# Patient Record
Sex: FEMALE | Race: WHITE | HISPANIC OR LATINO | Employment: FULL TIME | URBAN - METROPOLITAN AREA
[De-identification: names, ages, dates, MRNs, and addresses within clinical notes are randomized per-mention and may not be internally consistent; named-entity substitution may affect disease eponyms.]

---

## 2014-12-11 LAB — HEMOGLOBIN FRACTIONATION: NORMAL

## 2019-01-17 ENCOUNTER — OFFICE VISIT CONVERTED (OUTPATIENT)
Dept: GASTROENTEROLOGY | Facility: CLINIC | Age: 29
End: 2019-01-17
Attending: NURSE PRACTITIONER

## 2019-01-17 ENCOUNTER — CONVERSION ENCOUNTER (OUTPATIENT)
Dept: GASTROENTEROLOGY | Facility: CLINIC | Age: 29
End: 2019-01-17

## 2019-01-17 ENCOUNTER — HOSPITAL ENCOUNTER (OUTPATIENT)
Dept: LAB | Facility: HOSPITAL | Age: 29
Discharge: HOME | End: 2019-01-17

## 2019-01-17 LAB
ALBUMIN SERPL-MCNC: 4 G/DL (ref 3.5–5)
ALP SERPL-CCNC: 80 U/L (ref 42–98)
ALT SERPL-CCNC: 7 U/L (ref 10–40)
AST SERPL-CCNC: 11 U/L (ref 15–50)
BILIRUB SERPL-MCNC: 0.26 MG/DL (ref 0.2–1.3)
CONV BILI, CONJUGATED: <0.2 MG/DL (ref 0–0.6)
CONV TOTAL PROTEIN: 7.6 G/DL (ref 6.3–8.2)
CONV UNCONJUGATED BILIRUBIN: 0.1 MG/DL (ref 0–1.1)

## 2019-03-18 ENCOUNTER — CONVERSION ENCOUNTER (OUTPATIENT)
Dept: GASTROENTEROLOGY | Facility: CLINIC | Age: 29
End: 2019-03-18

## 2019-03-18 ENCOUNTER — OFFICE VISIT CONVERTED (OUTPATIENT)
Dept: GASTROENTEROLOGY | Facility: CLINIC | Age: 29
End: 2019-03-18
Attending: NURSE PRACTITIONER

## 2019-03-18 ENCOUNTER — HOSPITAL ENCOUNTER (OUTPATIENT)
Dept: LAB | Facility: HOSPITAL | Age: 29
Discharge: HOME | End: 2019-03-18

## 2019-03-21 LAB
CONV CELIAC DISEASE AB-IGA: 163 MG/DL (ref 68–408)
TTG IGA SER-ACNC: 0 U/ML (ref 0–3)

## 2021-05-16 VITALS
SYSTOLIC BLOOD PRESSURE: 108 MMHG | DIASTOLIC BLOOD PRESSURE: 75 MMHG | WEIGHT: 161 LBS | HEIGHT: 67 IN | HEART RATE: 79 BPM | TEMPERATURE: 97.6 F | BODY MASS INDEX: 25.27 KG/M2

## 2021-05-16 VITALS
SYSTOLIC BLOOD PRESSURE: 125 MMHG | DIASTOLIC BLOOD PRESSURE: 80 MMHG | HEART RATE: 90 BPM | BODY MASS INDEX: 26.21 KG/M2 | HEIGHT: 67 IN | TEMPERATURE: 97.8 F | WEIGHT: 167 LBS

## 2021-10-26 ENCOUNTER — INITIAL PRENATAL (OUTPATIENT)
Dept: OBSTETRICS AND GYNECOLOGY | Facility: CLINIC | Age: 31
End: 2021-10-26

## 2021-10-26 VITALS — WEIGHT: 187 LBS | BODY MASS INDEX: 29.29 KG/M2 | SYSTOLIC BLOOD PRESSURE: 128 MMHG | DIASTOLIC BLOOD PRESSURE: 85 MMHG

## 2021-10-26 DIAGNOSIS — K21.9 GASTROESOPHAGEAL REFLUX DISEASE WITHOUT ESOPHAGITIS: ICD-10-CM

## 2021-10-26 DIAGNOSIS — Z87.59 H/O RAPID LABOR: ICD-10-CM

## 2021-10-26 DIAGNOSIS — O21.9 NAUSEA AND VOMITING OF PREGNANCY, ANTEPARTUM: ICD-10-CM

## 2021-10-26 DIAGNOSIS — Z34.80 SUPERVISION OF OTHER NORMAL PREGNANCY, ANTEPARTUM: ICD-10-CM

## 2021-10-26 DIAGNOSIS — Z3A.01 LESS THAN 8 WEEKS GESTATION OF PREGNANCY: Primary | ICD-10-CM

## 2021-10-26 DIAGNOSIS — Z87.59 HISTORY OF RETAINED PLACENTA: ICD-10-CM

## 2021-10-26 DIAGNOSIS — O21.9 NAUSEA AND VOMITING DURING PREGNANCY: ICD-10-CM

## 2021-10-26 PROBLEM — K29.70 GASTRITIS: Status: ACTIVE | Noted: 2021-10-26

## 2021-10-26 PROBLEM — R11.0 NAUSEA: Status: ACTIVE | Noted: 2021-10-26

## 2021-10-26 PROBLEM — R11.0 NAUSEA: Status: RESOLVED | Noted: 2021-10-26 | Resolved: 2021-10-26

## 2021-10-26 LAB
ABO GROUP BLD: NORMAL
B-HCG UR QL: POSITIVE
BASOPHILS # BLD AUTO: 0.03 10*3/MM3 (ref 0–0.2)
BASOPHILS NFR BLD AUTO: 0.3 % (ref 0–1.5)
BLD GP AB SCN SERPL QL: NEGATIVE
C TRACH RRNA CVX QL NAA+PROBE: NOT DETECTED
DEPRECATED RDW RBC AUTO: 40.3 FL (ref 37–54)
EOSINOPHIL # BLD AUTO: 0.07 10*3/MM3 (ref 0–0.4)
EOSINOPHIL NFR BLD AUTO: 0.7 % (ref 0.3–6.2)
ERYTHROCYTE [DISTWIDTH] IN BLOOD BY AUTOMATED COUNT: 12.3 % (ref 12.3–15.4)
EXPIRATION DATE: ABNORMAL
GLUCOSE UR STRIP-MCNC: NEGATIVE MG/DL
HBV SURFACE AG SERPL QL IA: NORMAL
HCT VFR BLD AUTO: 37.7 % (ref 34–46.6)
HCV AB SER DONR QL: NORMAL
HGB BLD-MCNC: 12.3 G/DL (ref 12–15.9)
HIV1+2 AB SER QL: NORMAL
IMM GRANULOCYTES # BLD AUTO: 0.04 10*3/MM3 (ref 0–0.05)
IMM GRANULOCYTES NFR BLD AUTO: 0.4 % (ref 0–0.5)
INTERNAL NEGATIVE CONTROL: ABNORMAL
INTERNAL POSITIVE CONTROL: ABNORMAL
LYMPHOCYTES # BLD AUTO: 1.9 10*3/MM3 (ref 0.7–3.1)
LYMPHOCYTES NFR BLD AUTO: 19.6 % (ref 19.6–45.3)
Lab: ABNORMAL
MCH RBC QN AUTO: 29.3 PG (ref 26.6–33)
MCHC RBC AUTO-ENTMCNC: 32.6 G/DL (ref 31.5–35.7)
MCV RBC AUTO: 89.8 FL (ref 79–97)
MONOCYTES # BLD AUTO: 0.58 10*3/MM3 (ref 0.1–0.9)
MONOCYTES NFR BLD AUTO: 6 % (ref 5–12)
N GONORRHOEA RRNA SPEC QL NAA+PROBE: NOT DETECTED
NEUTROPHILS NFR BLD AUTO: 7.05 10*3/MM3 (ref 1.7–7)
NEUTROPHILS NFR BLD AUTO: 73 % (ref 42.7–76)
NRBC BLD AUTO-RTO: 0 /100 WBC (ref 0–0.2)
PLATELET # BLD AUTO: 329 10*3/MM3 (ref 140–450)
PMV BLD AUTO: 10.5 FL (ref 6–12)
PROT UR STRIP-MCNC: NEGATIVE MG/DL
RBC # BLD AUTO: 4.2 10*6/MM3 (ref 3.77–5.28)
RH BLD: POSITIVE
WBC # BLD AUTO: 9.67 10*3/MM3 (ref 3.4–10.8)

## 2021-10-26 PROCEDURE — 86803 HEPATITIS C AB TEST: CPT | Performed by: OBSTETRICS & GYNECOLOGY

## 2021-10-26 PROCEDURE — 81025 URINE PREGNANCY TEST: CPT | Performed by: OBSTETRICS & GYNECOLOGY

## 2021-10-26 PROCEDURE — G0432 EIA HIV-1/HIV-2 SCREEN: HCPCS | Performed by: OBSTETRICS & GYNECOLOGY

## 2021-10-26 PROCEDURE — 87491 CHLMYD TRACH DNA AMP PROBE: CPT | Performed by: OBSTETRICS & GYNECOLOGY

## 2021-10-26 PROCEDURE — 87340 HEPATITIS B SURFACE AG IA: CPT | Performed by: OBSTETRICS & GYNECOLOGY

## 2021-10-26 PROCEDURE — 86900 BLOOD TYPING SEROLOGIC ABO: CPT | Performed by: OBSTETRICS & GYNECOLOGY

## 2021-10-26 PROCEDURE — 99204 OFFICE O/P NEW MOD 45 MIN: CPT | Performed by: OBSTETRICS & GYNECOLOGY

## 2021-10-26 PROCEDURE — 86762 RUBELLA ANTIBODY: CPT | Performed by: OBSTETRICS & GYNECOLOGY

## 2021-10-26 PROCEDURE — 87591 N.GONORRHOEAE DNA AMP PROB: CPT | Performed by: OBSTETRICS & GYNECOLOGY

## 2021-10-26 PROCEDURE — 86901 BLOOD TYPING SEROLOGIC RH(D): CPT | Performed by: OBSTETRICS & GYNECOLOGY

## 2021-10-26 PROCEDURE — 85025 COMPLETE CBC W/AUTO DIFF WBC: CPT | Performed by: OBSTETRICS & GYNECOLOGY

## 2021-10-26 PROCEDURE — 86850 RBC ANTIBODY SCREEN: CPT | Performed by: OBSTETRICS & GYNECOLOGY

## 2021-10-26 PROCEDURE — 87086 URINE CULTURE/COLONY COUNT: CPT | Performed by: OBSTETRICS & GYNECOLOGY

## 2021-10-26 RX ORDER — DOXYLAMINE SUCCINATE AND PYRIDOXINE HYDROCHLORIDE, DELAYED RELEASE TABLETS 10 MG/10 MG 10; 10 MG/1; MG/1
2 TABLET, DELAYED RELEASE ORAL
Qty: 60 TABLET | Refills: 2 | Status: ON HOLD | OUTPATIENT
Start: 2021-10-26 | End: 2022-05-19

## 2021-10-26 RX ORDER — PRENATAL VIT NO.126/IRON/FOLIC 28MG-0.8MG
1 TABLET ORAL DAILY
COMMUNITY
End: 2023-03-09

## 2021-10-27 PROBLEM — K29.70 GASTRITIS: Chronic | Status: ACTIVE | Noted: 2021-10-26

## 2021-10-27 PROBLEM — O21.9 NAUSEA AND VOMITING OF PREGNANCY, ANTEPARTUM: Status: ACTIVE | Noted: 2021-10-27

## 2021-10-27 PROBLEM — K21.9 GERD (GASTROESOPHAGEAL REFLUX DISEASE): Chronic | Status: ACTIVE | Noted: 2021-10-26

## 2021-10-27 LAB
BACTERIA SPEC AEROBE CULT: NO GROWTH
RUBV IGG SERPL IA-ACNC: 2.72 INDEX

## 2021-10-27 NOTE — ASSESSMENT & PLAN NOTE
Continue PNV  Dating ultrasound  Call coverage reviewed  Meds safe in pregnancy reviewed  O.V. schedule reviewed  Declines prenatal genetic screening

## 2021-10-27 NOTE — PROGRESS NOTES
OB Initial Visit    CC- Here for care of current pregnancy       Subjective:  31 y.o.  presenting for her first obstetrical visit.    LMP: Patient's last menstrual period was 2021. sure    COMPLAINS OF: Nausea    Denies vaginal bleeding, pelvic cramping or other concerns      Reviewed and updated:  OBHx, GYNHx (STDs), PMHx, Medications, Allergies, PSHx, Social Hx, Preventative Hx (PAP), Hx of abuse/safe environment, Vaccine Hx including hx of chickenpox or vaccine, Genetic Hx (pt, FOB, both families).        Objective:  /85   Wt 84.8 kg (187 lb)   LMP 2021   BMI 29.29 kg/m²   General- NAD, alert and oriented, appropriate  Psych- Normal mood, good memory  Neck- No masses, no thyroid enlargement  CV- Regular rhythm, no murnurs  Resp- CTA to bases, no wheezes  Abdomen- Soft, non distended, non tender, no masses     Breast left- No mass, non tender, no nipple discharge  Breast right- No mass, non tender, no nipple discharge    External genitalia- Normal, no lesions  Urethra- Normal, no masses, non tender  Vagina- Normal, no discharge  Bladder- Normal, no masses, non tender  Cvx- Normal, no lesions, no discharge, no CMT  Uterus- Normal shape and consistency, non tender, Consistent with dates  Adnexa- Normal, no mass, non tender    Lymphatic- No palpable neck, axillary, or groin nodes  Ext- No edema, no cyanosis    Skin- No lesions, no rashes, no acanthosis nigricans      Assessment and Plan:  Diagnoses and all orders for this visit:    1. Less than 8 weeks gestation of pregnancy (Primary)  -     POC Urinalysis Dipstick  -     POC Pregnancy, Urine  -     Chlamydia trachomatis, Neisseria gonorrhoeae, PCR - , Cervix  -     Urine Culture - Urine, Urine, Clean Catch  -     OB Panel With HIV    2. Supervision of other normal pregnancy, antepartum  Overview:  EDC    Declines prenatal genetic testing    Anatomy u/s    Covid Vaccine - Complete  Flu vaccine - Recommended  Tdap vaccine -    Assessment &  Plan:  Continue PNV  Dating ultrasound  Call coverage reviewed  Meds safe in pregnancy reviewed  O.V. schedule reviewed  Declines prenatal genetic screening    Orders:  -     US Ob < 14 Weeks Single or First Gestation; Future    3. History of retained placenta    4. H/O rapid labor    5. Nausea and vomiting during pregnancy  -     doxylamine-pyridoxine (Diclegis) 10-10 MG tablet delayed-release EC tablet; Take 2 tablets by mouth every night at bedtime.  Dispense: 60 tablet; Refill: 2    6. Gastroesophageal reflux disease without esophagitis    7. Nausea and vomiting of pregnancy, antepartum  Overview:  Diclegis    Assessment & Plan:  The patient reports significant nausea and vomiting and requests diclegis specifically            7w6d    Genetic Screening: Counseled.  Declines all.     Vaccines: Recommend FLU vaccine this season, R/B discussed  s/p COVID vaccine    Counseling: Nutrition discussed, calories, activity/exercise in pregnancy  Discussed dietary restrictions/safety food preparation in pregnancy  Reviewed what to expect prenatal visits, office providers  Appropriate trimester precautions provided, N/V, vag bleeding, cramping  Questions answered    Return in about 4 weeks (around 11/23/2021) for AFTER ULTRASOUND.      Guzman Engle MD  10/26/2021

## 2021-10-28 LAB — RPR SER QL: NORMAL

## 2021-11-24 ENCOUNTER — ROUTINE PRENATAL (OUTPATIENT)
Dept: OBSTETRICS AND GYNECOLOGY | Facility: CLINIC | Age: 31
End: 2021-11-24

## 2021-11-24 VITALS — BODY MASS INDEX: 28.91 KG/M2 | DIASTOLIC BLOOD PRESSURE: 82 MMHG | SYSTOLIC BLOOD PRESSURE: 129 MMHG | WEIGHT: 184.6 LBS

## 2021-11-24 DIAGNOSIS — K21.9 GASTROESOPHAGEAL REFLUX DISEASE, UNSPECIFIED WHETHER ESOPHAGITIS PRESENT: Chronic | ICD-10-CM

## 2021-11-24 DIAGNOSIS — O21.9 NAUSEA AND VOMITING OF PREGNANCY, ANTEPARTUM: ICD-10-CM

## 2021-11-24 DIAGNOSIS — Z34.80 SUPERVISION OF OTHER NORMAL PREGNANCY, ANTEPARTUM: Primary | ICD-10-CM

## 2021-11-24 LAB
GLUCOSE UR STRIP-MCNC: NEGATIVE MG/DL
PROT UR STRIP-MCNC: NEGATIVE MG/DL

## 2021-11-24 PROCEDURE — 99214 OFFICE O/P EST MOD 30 MIN: CPT | Performed by: OBSTETRICS & GYNECOLOGY

## 2021-11-24 RX ORDER — ONDANSETRON 4 MG/1
4 TABLET, FILM COATED ORAL EVERY 8 HOURS PRN
Qty: 40 TABLET | Refills: 3 | Status: SHIPPED | OUTPATIENT
Start: 2021-11-24 | End: 2022-04-19

## 2021-11-24 RX ORDER — OMEPRAZOLE 40 MG/1
40 CAPSULE, DELAYED RELEASE ORAL DAILY
Qty: 90 CAPSULE | Refills: 3 | Status: SHIPPED | OUTPATIENT
Start: 2021-11-24 | End: 2023-03-09

## 2021-12-23 ENCOUNTER — ROUTINE PRENATAL (OUTPATIENT)
Dept: OBSTETRICS AND GYNECOLOGY | Facility: CLINIC | Age: 31
End: 2021-12-23

## 2021-12-23 VITALS — DIASTOLIC BLOOD PRESSURE: 82 MMHG | SYSTOLIC BLOOD PRESSURE: 133 MMHG | WEIGHT: 187 LBS | BODY MASS INDEX: 29.29 KG/M2

## 2021-12-23 DIAGNOSIS — Z34.80 SUPERVISION OF OTHER NORMAL PREGNANCY, ANTEPARTUM: Primary | ICD-10-CM

## 2021-12-23 DIAGNOSIS — K21.9 GASTROESOPHAGEAL REFLUX DISEASE, UNSPECIFIED WHETHER ESOPHAGITIS PRESENT: Chronic | ICD-10-CM

## 2021-12-23 DIAGNOSIS — K59.01 SLOW TRANSIT CONSTIPATION: ICD-10-CM

## 2021-12-23 LAB
GLUCOSE UR STRIP-MCNC: NEGATIVE MG/DL
PROT UR STRIP-MCNC: NEGATIVE MG/DL

## 2021-12-23 PROCEDURE — 99213 OFFICE O/P EST LOW 20 MIN: CPT | Performed by: STUDENT IN AN ORGANIZED HEALTH CARE EDUCATION/TRAINING PROGRAM

## 2021-12-23 RX ORDER — DOCUSATE SODIUM 100 MG/1
100 CAPSULE, LIQUID FILLED ORAL 2 TIMES DAILY
Qty: 60 CAPSULE | Refills: 1 | Status: SHIPPED | OUTPATIENT
Start: 2021-12-23 | End: 2022-02-01

## 2021-12-23 NOTE — PROGRESS NOTES
OB FOLLOW UP  Complaint   Chief Complaint   Patient presents with   • Routine Prenatal Visit            Deborah WILLSON is a 31 y.o.  16w5d patient being seen today for her obstetrical follow up visit. Patient denies decreased fetal movement, contractions, loss of fluid or vaginal bleeding.  Dealing with constipation. Aches and pains. No tried anything for relief. No fevers or chills. Occassional nausea.     Her prenatal care is complicated by (and status) :    Patient Active Problem List   Diagnosis   • Supervision of other normal pregnancy, antepartum   • History of retained placenta   • H/O rapid labor   • Gastritis   • GERD (gastroesophageal reflux disease)   • Nausea and vomiting of pregnancy, antepartum   • Slow transit constipation       All other systems reviewed and are negative.     The additional following portions of the patient's history were reviewed and updated as appropriate: allergies, current medications, past family history, past medical history, past social history, past surgical history and problem list.      EXAM:     Vital signs: /82   Wt 84.8 kg (187 lb)   LMP 2021   BMI 29.29 kg/m²   Appearance/psychiatric: To be in no distress  Constitutional: The patient is well nourished.  Cardiovascular: She does not have edema.  Respiratory: Respiratory effort is normal.  Gastrointestinal: Abdomen is soft, gravid, nontender, no rashes, heart tones are present, fundal height is size equals dates    Pelvic Exam: No    Urine glucose/protein: See prenatal flowsheet       Assessment and Plan    Problem List Items Addressed This Visit        Gastrointestinal Abdominal     Slow transit constipation    Relevant Medications    docusate sodium (Colace) 100 MG capsule    GERD (gastroesophageal reflux disease) (Chronic)    Overview     Prilosec, 2021         Relevant Medications    omeprazole (priLOSEC) 40 MG capsule       Gravid and     Supervision of other normal pregnancy,  antepartum - Primary    Overview     EDC    Declines prenatal genetic testing    Anatomy u/s    Covid Vaccine - Complete  Flu vaccine - Recommended  Tdap vaccine -         Relevant Orders    POC Urinalysis Dipstick (Completed)          Impression  1. Pregnancy at 16w5d  2. Fetal status reassuring.   3. Activity and Exercise discussed.    Plan  1. Follow up 4 weeks with anatomy scan  2. Colace to pharmacy 100mg BID  3. Tylenol PRN for aches and pains; pregnancy belt   4. Homeopathic remedies for nausea discussed including saltine crackers.       Patient was counseled to the following pregnancy precautions:  • Decreased fetal movement, if concern for decreased fetal movement please perform fetal kick counts you are looking for 10 movements in 2 hours.  If concern for fetal movement and not meeting that criteria, please present to triage for evaluation.  • Contractions occurring every 5 minutes for over an hour, lasting 30 to 60 seconds and progressively causing more discomfort, please seek medical attention to rule out labor  • If you believe that your water is broken, place a sanitary pad.  If pad fills in short period of time i.e. less than 5 minutes, take off pad placed another pad.  If this is saturated please present for rule out rupture of membranes  • Vaginal bleeding can be normal in pregnancy, this usually takes a form of spotting.  If having heavier bleeding like a menstrual period please present for evaluation; especially in light of severe abdominal pain this could represent a placental abruption.  • Keep all scheduled appointments as recommended.        Tevin Hutton MD  12/23/2021

## 2022-01-17 PROCEDURE — U0004 COV-19 TEST NON-CDC HGH THRU: HCPCS | Performed by: FAMILY MEDICINE

## 2022-01-18 ENCOUNTER — TELEPHONE (OUTPATIENT)
Dept: OBSTETRICS AND GYNECOLOGY | Facility: CLINIC | Age: 32
End: 2022-01-18

## 2022-01-18 NOTE — TELEPHONE ENCOUNTER
Pt called stating she tested positive for covid yesterday, her symptoms started on 01/14. She states she will be out of quarantine on 01/24. I adv if she is feeling better with no symptoms than she will be okay to come go to her ultrasound and come to her follow up on 01/25 and 01/26. Adv to call the office if she is still sick for recommendations.

## 2022-01-25 ENCOUNTER — HOSPITAL ENCOUNTER (OUTPATIENT)
Dept: ULTRASOUND IMAGING | Facility: HOSPITAL | Age: 32
Discharge: HOME | End: 2022-01-25
Admitting: OBSTETRICS & GYNECOLOGY

## 2022-01-25 DIAGNOSIS — Z34.80 SUPERVISION OF OTHER NORMAL PREGNANCY, ANTEPARTUM: ICD-10-CM

## 2022-01-25 PROCEDURE — 76811 OB US DETAILED SNGL FETUS: CPT

## 2022-01-26 ENCOUNTER — ROUTINE PRENATAL (OUTPATIENT)
Dept: OBSTETRICS AND GYNECOLOGY | Facility: CLINIC | Age: 32
End: 2022-01-26

## 2022-01-26 VITALS — BODY MASS INDEX: 30.23 KG/M2 | SYSTOLIC BLOOD PRESSURE: 135 MMHG | WEIGHT: 193 LBS | DIASTOLIC BLOOD PRESSURE: 89 MMHG

## 2022-01-26 DIAGNOSIS — Z34.80 SUPERVISION OF OTHER NORMAL PREGNANCY, ANTEPARTUM: Primary | ICD-10-CM

## 2022-01-26 DIAGNOSIS — K59.01 SLOW TRANSIT CONSTIPATION: ICD-10-CM

## 2022-01-26 LAB
GLUCOSE UR STRIP-MCNC: NEGATIVE MG/DL
PROT UR STRIP-MCNC: NEGATIVE MG/DL

## 2022-01-26 PROCEDURE — 0502F SUBSEQUENT PRENATAL CARE: CPT | Performed by: OBSTETRICS & GYNECOLOGY

## 2022-01-26 NOTE — PROGRESS NOTES
Chief Complaint:  Scheduled OB visit    HPI: 31 y.o.  at 21w4d   Positive baby movement  Anatomy ultrasound yesterday    Vitals:    22 1047   BP: 135/89   Weight: 87.5 kg (193 lb)       See OB flowsheet also for pregnancy related data.    A/P  Intrauterine pregnancy at 21w4d   Diagnoses and all orders for this visit:    1. Supervision of other normal pregnancy, antepartum (Primary)  Overview:  EDC    Declines prenatal genetic testing    Anatomy u/s    Covid Vaccine - Complete  Flu vaccine - Recommended  Tdap vaccine -    Orders:  -     POC Urinalysis Dipstick    2. Slow transit constipation    Continue prenatal vitamins.  Anatomy ultrasound was incomplete for right ventricular outflow tract and face.  We will repeat anatomy scan    PLAN:   Return in about 4 weeks (around 2022).    Ac Hood Sr., MD  2022 11:03 EST

## 2022-02-01 PROCEDURE — U0004 COV-19 TEST NON-CDC HGH THRU: HCPCS | Performed by: NURSE PRACTITIONER

## 2022-02-08 ENCOUNTER — TELEPHONE (OUTPATIENT)
Dept: OBSTETRICS AND GYNECOLOGY | Facility: CLINIC | Age: 32
End: 2022-02-08

## 2022-02-08 NOTE — TELEPHONE ENCOUNTER
Patient left a message stating she had questions about some OTC medications. I left her a message trying to return her call.

## 2022-02-08 NOTE — TELEPHONE ENCOUNTER
Patient called back and states she wanted to know what she can use over the counter to help. Patient advised she can use Monistat-7.

## 2022-02-23 ENCOUNTER — ROUTINE PRENATAL (OUTPATIENT)
Dept: OBSTETRICS AND GYNECOLOGY | Facility: CLINIC | Age: 32
End: 2022-02-23

## 2022-02-23 VITALS — WEIGHT: 193 LBS | SYSTOLIC BLOOD PRESSURE: 134 MMHG | BODY MASS INDEX: 30.23 KG/M2 | DIASTOLIC BLOOD PRESSURE: 93 MMHG

## 2022-02-23 DIAGNOSIS — K59.01 SLOW TRANSIT CONSTIPATION: ICD-10-CM

## 2022-02-23 DIAGNOSIS — Z34.80 SUPERVISION OF OTHER NORMAL PREGNANCY, ANTEPARTUM: Primary | ICD-10-CM

## 2022-02-23 LAB
DEPRECATED RDW RBC AUTO: 38.2 FL (ref 37–54)
ERYTHROCYTE [DISTWIDTH] IN BLOOD BY AUTOMATED COUNT: 12 % (ref 12.3–15.4)
GLUCOSE 1H P GLC SERPL-MCNC: 156 MG/DL (ref 65–139)
GLUCOSE UR STRIP-MCNC: NEGATIVE MG/DL
HCT VFR BLD AUTO: 30.5 % (ref 34–46.6)
HGB BLD-MCNC: 10.1 G/DL (ref 12–15.9)
MCH RBC QN AUTO: 29.4 PG (ref 26.6–33)
MCHC RBC AUTO-ENTMCNC: 33.1 G/DL (ref 31.5–35.7)
MCV RBC AUTO: 88.9 FL (ref 79–97)
PLATELET # BLD AUTO: 333 10*3/MM3 (ref 140–450)
PMV BLD AUTO: 10.6 FL (ref 6–12)
PROT UR STRIP-MCNC: NEGATIVE MG/DL
RBC # BLD AUTO: 3.43 10*6/MM3 (ref 3.77–5.28)
WBC NRBC COR # BLD: 7.81 10*3/MM3 (ref 3.4–10.8)

## 2022-02-23 PROCEDURE — 0502F SUBSEQUENT PRENATAL CARE: CPT | Performed by: OBSTETRICS & GYNECOLOGY

## 2022-02-23 PROCEDURE — 82950 GLUCOSE TEST: CPT | Performed by: OBSTETRICS & GYNECOLOGY

## 2022-02-23 PROCEDURE — 85027 COMPLETE CBC AUTOMATED: CPT | Performed by: OBSTETRICS & GYNECOLOGY

## 2022-02-23 NOTE — PROGRESS NOTES
Chief Complaint:  Scheduled OB visit    HPI: 31 y.o.  at 25w4d   Positive baby movement  Glucola today, Rh+    Vitals:    22 1406   BP: 134/93   Weight: 87.5 kg (193 lb)       See OB flowsheet also for pregnancy related data.    A/P  Intrauterine pregnancy at 25w4d   Diagnoses and all orders for this visit:    1. Supervision of other normal pregnancy, antepartum (Primary)  Overview:  EDC    Declines prenatal genetic testing    Anatomy u/s    Covid Vaccine - Complete  Flu vaccine - Recommended  Tdap vaccine -    Orders:  -     POC Urinalysis Dipstick  -     CBC (No Diff)  -     Gestational Diabetes Screen 1 Hour    2. Slow transit constipation    Blood pressure is out of parameters.    Continue prenatal vitamins.  Encouraged fetal kick counts, 10 movements in 2 hours every day.  To labor and delivery if lack fetal movement    PLAN:   Return in about 4 weeks (around 3/23/2022).    Ac Hood Sr., MD  2022 14:35 EST

## 2022-03-04 ENCOUNTER — HOSPITAL ENCOUNTER (OUTPATIENT)
Dept: ULTRASOUND IMAGING | Facility: HOSPITAL | Age: 32
Discharge: HOME | End: 2022-03-04
Admitting: OBSTETRICS & GYNECOLOGY

## 2022-03-04 DIAGNOSIS — Z34.80 SUPERVISION OF OTHER NORMAL PREGNANCY, ANTEPARTUM: ICD-10-CM

## 2022-03-04 PROCEDURE — 76805 OB US >/= 14 WKS SNGL FETUS: CPT

## 2022-03-23 ENCOUNTER — ROUTINE PRENATAL (OUTPATIENT)
Dept: OBSTETRICS AND GYNECOLOGY | Facility: CLINIC | Age: 32
End: 2022-03-23

## 2022-03-23 VITALS — BODY MASS INDEX: 30.54 KG/M2 | SYSTOLIC BLOOD PRESSURE: 118 MMHG | DIASTOLIC BLOOD PRESSURE: 81 MMHG | WEIGHT: 195 LBS

## 2022-03-23 DIAGNOSIS — K59.01 SLOW TRANSIT CONSTIPATION: ICD-10-CM

## 2022-03-23 DIAGNOSIS — O36.60X0 EXCESSIVE FETAL GROWTH AFFECTING MANAGEMENT OF PREGNANCY, ANTEPARTUM, SINGLE OR UNSPECIFIED FETUS: ICD-10-CM

## 2022-03-23 DIAGNOSIS — Z34.80 SUPERVISION OF OTHER NORMAL PREGNANCY, ANTEPARTUM: Primary | ICD-10-CM

## 2022-03-23 LAB
GLUCOSE UR STRIP-MCNC: NEGATIVE MG/DL
PROT UR STRIP-MCNC: ABNORMAL MG/DL

## 2022-03-23 PROCEDURE — 0502F SUBSEQUENT PRENATAL CARE: CPT | Performed by: OBSTETRICS & GYNECOLOGY

## 2022-03-23 NOTE — PROGRESS NOTES
Chief Complaint:  Scheduled OB visit    HPI: 32 y.o.  at 29w4d     History of 8 pounds 9 ounce vaginal delivery at 38 weeks    Vitals:    22 0946   BP: 118/81   Weight: 88.5 kg (195 lb)       See OB flowsheet also for pregnancy related data.    A/P  Intrauterine pregnancy at 29w4d   Diagnoses and all orders for this visit:    1. Supervision of other normal pregnancy, antepartum (Primary)  Overview:  EDC    Declines prenatal genetic testing    Anatomy u/s    Covid Vaccine - Complete  Flu vaccine - Recommended  Tdap vaccine -    Orders:  -     POC Urinalysis Dipstick    2. Slow transit constipation    3. Excessive fetal growth affecting management of pregnancy, antepartum, single or unspecified fetus  Recent ultrasound on  notes 76 percentile.  I would estimate this baby at approximately 8-1/2 pounds at 39 weeks, fundal height currently approximately 2 cm large for gestational age    Continue prenatal vitamins.  Discussed ultrasound findings, no placenta previa present.  Encouraged fetal kick counts, 10 movements in 2 hours every day.  To labor and delivery if lack fetal movement    PLAN:   Return in about 2 weeks (around 2022).    Ac Hood Sr., MD  3/23/2022 10:08 EDT

## 2022-04-05 ENCOUNTER — ROUTINE PRENATAL (OUTPATIENT)
Dept: OBSTETRICS AND GYNECOLOGY | Facility: CLINIC | Age: 32
End: 2022-04-05

## 2022-04-05 VITALS — BODY MASS INDEX: 30.57 KG/M2 | SYSTOLIC BLOOD PRESSURE: 131 MMHG | DIASTOLIC BLOOD PRESSURE: 85 MMHG | WEIGHT: 195.2 LBS

## 2022-04-05 DIAGNOSIS — Z34.80 SUPERVISION OF OTHER NORMAL PREGNANCY, ANTEPARTUM: Primary | ICD-10-CM

## 2022-04-05 DIAGNOSIS — K21.9 GASTROESOPHAGEAL REFLUX DISEASE, UNSPECIFIED WHETHER ESOPHAGITIS PRESENT: Chronic | ICD-10-CM

## 2022-04-05 PROBLEM — K58.9 IRRITABLE BOWEL SYNDROME: Status: ACTIVE | Noted: 2022-04-05

## 2022-04-05 LAB
GLUCOSE UR STRIP-MCNC: NEGATIVE MG/DL
PROT UR STRIP-MCNC: ABNORMAL MG/DL

## 2022-04-05 PROCEDURE — 0502F SUBSEQUENT PRENATAL CARE: CPT | Performed by: OBSTETRICS & GYNECOLOGY

## 2022-04-05 NOTE — ASSESSMENT & PLAN NOTE
Continue prenatal vitamins  Fetal kick counts   labor warnings  Jimmie patient's elevated 1 hour GTT.  Discussed the implications of gestational diabetes, particularly of poorly controlled and untreated.  Recommended either blood sugar monitoring or 3-hour GTT ASAP.  The patient prefers a 3-hour GTT.  Orders placed.

## 2022-04-05 NOTE — PROGRESS NOTES
OB FOLLOW UP    CC: Scheduled OB routine FU     Prenatal care complicated by:   Patient Active Problem List   Diagnosis   • Supervision of other normal pregnancy, antepartum   • History of retained placenta   • H/O rapid labor   • Gastritis   • GERD (gastroesophageal reflux disease)   • Nausea and vomiting of pregnancy, antepartum   • Slow transit constipation   • Irritable bowel syndrome       Subjective:   Patient has: No complaints, No leaking fluid, No vaginal bleeding, No contractions, Adequate FM  Patient reports she has not yet done a 3-hour GTT.    Objective:  Urine glucose/protein- see flow sheet      /85   Wt 88.5 kg (195 lb 3.2 oz)   LMP 2021   BMI 30.57 kg/m²   See OB flow for LE edema, and cvx exam if applicable  FHT: 146 BPM   Uterine Size: 32 cm      Assessment and Plan:  Diagnoses and all orders for this visit:    1. Supervision of other normal pregnancy, antepartum (Primary)  Overview:  EDC: 2022    Declines prenatal genetic testing    Anatomy u/s: Limited right ventricular outflow tract and face.  Follow-up ultrasound normal anatomy.    Covid Vaccine - Complete  Flu vaccine - Recommended  Tdap vaccine -    Assessment & Plan:  Continue prenatal vitamins  Fetal kick counts   labor warnings  Jimmie patient's elevated 1 hour GTT.  Discussed the implications of gestational diabetes, particularly of poorly controlled and untreated.  Recommended either blood sugar monitoring or 3-hour GTT ASAP.  The patient prefers a 3-hour GTT.  Orders placed.    Orders:  -     POC Urinalysis Dipstick  -     Gestational, Glucose Tolerance, 3 Hour; Future    2. Gastroesophageal reflux disease, unspecified whether esophagitis present  Overview:  Prilosec, 2021    Assessment & Plan:  Continue Prilosec daily          31w3d  Reassuring pregnancy progress    Counseling: OB precautions, leaking, VB, yani bernal vs PTL/Labor  FKC    Questions answered    Return in about 2 weeks (around 2022)  for Recheck.      Guzman Engle MD  04/05/2022

## 2022-04-19 ENCOUNTER — ROUTINE PRENATAL (OUTPATIENT)
Dept: OBSTETRICS AND GYNECOLOGY | Facility: CLINIC | Age: 32
End: 2022-04-19

## 2022-04-19 VITALS — SYSTOLIC BLOOD PRESSURE: 129 MMHG | BODY MASS INDEX: 30.54 KG/M2 | DIASTOLIC BLOOD PRESSURE: 80 MMHG | WEIGHT: 195 LBS

## 2022-04-19 DIAGNOSIS — K21.9 GASTROESOPHAGEAL REFLUX DISEASE, UNSPECIFIED WHETHER ESOPHAGITIS PRESENT: Chronic | ICD-10-CM

## 2022-04-19 DIAGNOSIS — Z34.80 SUPERVISION OF OTHER NORMAL PREGNANCY, ANTEPARTUM: Primary | ICD-10-CM

## 2022-04-19 PROBLEM — O21.9 NAUSEA AND VOMITING OF PREGNANCY, ANTEPARTUM: Status: RESOLVED | Noted: 2021-10-27 | Resolved: 2022-04-19

## 2022-04-19 LAB
GLUCOSE UR STRIP-MCNC: NEGATIVE MG/DL
PROT UR STRIP-MCNC: NEGATIVE MG/DL

## 2022-04-19 PROCEDURE — 0502F SUBSEQUENT PRENATAL CARE: CPT | Performed by: OBSTETRICS & GYNECOLOGY

## 2022-04-19 NOTE — PROGRESS NOTES
OB FOLLOW UP    CC: Scheduled OB routine FU     Prenatal care complicated by:   Patient Active Problem List   Diagnosis   • Supervision of other normal pregnancy, antepartum   • History of retained placenta   • H/O rapid labor   • Gastritis   • GERD (gastroesophageal reflux disease)   • Slow transit constipation   • Irritable bowel syndrome       Subjective:   Patient has: No complaints, No leaking fluid, No vaginal bleeding, No contractions, Adequate FM  Reports some Wheeler Burns    Objective:  Urine glucose/protein- see flow sheet      /80   Wt 88.5 kg (195 lb)   LMP 2021   BMI 30.54 kg/m²   See OB flow for LE edema, and cvx exam if applicable  FHT: 149 BPM   Uterine Size: 34 cm      Assessment and Plan:  Diagnoses and all orders for this visit:    1. Supervision of other normal pregnancy, antepartum (Primary)  Overview:  EDC: 2022    Declines prenatal genetic testing    Anatomy u/s: Limited right ventricular outflow tract and face.  Follow-up ultrasound normal anatomy.    Covid Vaccine - Complete  Flu vaccine - Recommended  Tdap vaccine -    Assessment & Plan:  Continue prenatal vitamins  Fetal kick counts   labor warnings    Orders:  -     POC Urinalysis Dipstick    2. Gastroesophageal reflux disease, unspecified whether esophagitis present  Overview:  Prilosec, 2021    Assessment & Plan:  Continue Prilosec          33w3d  Reassuring pregnancy progress    Counseling: OB precautions, leaking, VB, yani burns vs PTL/Labor  FKC    Questions answered    Return in about 2 weeks (around 5/3/2022) for Recheck.      Guzman Engle MD  2022

## 2022-04-20 ENCOUNTER — LAB (OUTPATIENT)
Dept: LAB | Facility: HOSPITAL | Age: 32
End: 2022-04-20

## 2022-04-20 DIAGNOSIS — Z34.80 SUPERVISION OF OTHER NORMAL PREGNANCY, ANTEPARTUM: ICD-10-CM

## 2022-04-20 LAB
GLUCOSE BLDC GLUCOMTR-MCNC: 86 MG/DL (ref 70–99)
GLUCOSE P FAST SERPL-MCNC: 88 MG/DL (ref 65–94)
GTT GEST 2H PNL UR+SERPL: 177 MG/DL (ref 65–179)
GTT GEST 3H PNL SERPL: 128 MG/DL (ref 65–139)
GTT GEST 3H PNL SERPL: 170 MG/DL (ref 65–154)

## 2022-04-20 PROCEDURE — 82952 GTT-ADDED SAMPLES: CPT

## 2022-04-20 PROCEDURE — 82951 GLUCOSE TOLERANCE TEST (GTT): CPT

## 2022-04-20 PROCEDURE — 36415 COLL VENOUS BLD VENIPUNCTURE: CPT

## 2022-05-04 ENCOUNTER — ROUTINE PRENATAL (OUTPATIENT)
Dept: OBSTETRICS AND GYNECOLOGY | Facility: CLINIC | Age: 32
End: 2022-05-04

## 2022-05-04 VITALS — WEIGHT: 195 LBS | BODY MASS INDEX: 30.54 KG/M2 | DIASTOLIC BLOOD PRESSURE: 82 MMHG | SYSTOLIC BLOOD PRESSURE: 132 MMHG

## 2022-05-04 DIAGNOSIS — Z34.80 SUPERVISION OF OTHER NORMAL PREGNANCY, ANTEPARTUM: Primary | ICD-10-CM

## 2022-05-04 LAB
GLUCOSE UR STRIP-MCNC: NEGATIVE MG/DL
PROT UR STRIP-MCNC: NEGATIVE MG/DL

## 2022-05-04 PROCEDURE — 0502F SUBSEQUENT PRENATAL CARE: CPT | Performed by: OBSTETRICS & GYNECOLOGY

## 2022-05-04 RX ORDER — ONDANSETRON 4 MG/1
4 TABLET, FILM COATED ORAL EVERY 8 HOURS PRN
COMMUNITY
End: 2023-03-09

## 2022-05-04 NOTE — PROGRESS NOTES
Chief Complaint:  Scheduled OB visit    HPI: 32 y.o.  at 35w4d   Positive baby movement    Vitals:    22 0910   BP: 132/82   Weight: 88.5 kg (195 lb)       See OB flowsheet also for pregnancy related data.    A/P  Intrauterine pregnancy at 35w4d   Diagnoses and all orders for this visit:    1. Supervision of other normal pregnancy, antepartum (Primary)  Overview:  EDC: 2022    Declines prenatal genetic testing    Anatomy u/s: Limited right ventricular outflow tract and face.  Follow-up ultrasound normal anatomy.    Covid Vaccine - Complete  Flu vaccine - Recommended  Tdap vaccine -    Orders:  -     POC Urinalysis Dipstick      Blood pressure is out of parameters.    Continue prenatal vitamins.  Encouraged fetal kick counts, 10 movements in 2 hours every day.  To labor and delivery if lack fetal movement   Discussed Glucola results, normal 3-hour.  Rh+ reviewed.    PLAN:   Return in about 1 week (around 2022).    Ac Hood Sr., MD  2022 09:22 EDT

## 2022-05-10 NOTE — PROGRESS NOTES
Chief Complaint:  Scheduled OB visit    HPI: 32 y.o.  at 36w3d   Positive baby movement  Blood pressure mildly elevated today.  Discussed need for labs.    Vitals:    22 1509 22 1524   BP: 141/96 147/96   Weight: 88.5 kg (195 lb)        See OB flowsheet also for pregnancy related data.    A/P  Intrauterine pregnancy at 36w3d   Diagnoses and all orders for this visit:    1. Supervision of other normal pregnancy, antepartum (Primary)  Overview:  EDC: 2022    Declines prenatal genetic testing    Anatomy u/s: Limited right ventricular outflow tract and face.  Follow-up ultrasound normal anatomy.    Covid Vaccine - Complete  Flu vaccine - Recommended  Tdap vaccine -    Orders:  -     POC Urinalysis Dipstick  -     Group B Streptococcus Culture - Swab, Vaginal/Rectum    2. Slow transit constipation    Elevated blood pressure today  CBC comprehensive metabolic panel, PC ratio ordered for at the hospital today.  NST ordered.    Continue prenatal vitamins.  Encouraged fetal kick counts, 10 movements in 2 hours every day.  To labor and delivery if lack fetal movement  Pre-eclampsia symptoms were discussed and warnings were given.    PLAN:   Return in about 1 week (around 2022).    Ac Hood Sr., MD  2022 15:29 EDT

## 2022-05-11 ENCOUNTER — HOSPITAL ENCOUNTER (OUTPATIENT)
Facility: HOSPITAL | Age: 32
Discharge: HOME | End: 2022-05-11
Attending: OBSTETRICS & GYNECOLOGY | Admitting: OBSTETRICS & GYNECOLOGY

## 2022-05-11 ENCOUNTER — ROUTINE PRENATAL (OUTPATIENT)
Dept: OBSTETRICS AND GYNECOLOGY | Facility: CLINIC | Age: 32
End: 2022-05-11

## 2022-05-11 VITALS — SYSTOLIC BLOOD PRESSURE: 136 MMHG | RESPIRATION RATE: 18 BRPM | DIASTOLIC BLOOD PRESSURE: 89 MMHG | HEART RATE: 114 BPM

## 2022-05-11 VITALS — DIASTOLIC BLOOD PRESSURE: 96 MMHG | WEIGHT: 195 LBS | BODY MASS INDEX: 30.54 KG/M2 | SYSTOLIC BLOOD PRESSURE: 147 MMHG

## 2022-05-11 DIAGNOSIS — K59.01 SLOW TRANSIT CONSTIPATION: ICD-10-CM

## 2022-05-11 DIAGNOSIS — Z34.80 SUPERVISION OF OTHER NORMAL PREGNANCY, ANTEPARTUM: Primary | ICD-10-CM

## 2022-05-11 LAB
ALBUMIN SERPL-MCNC: 3.4 G/DL (ref 3.5–5.2)
ALBUMIN/GLOB SERPL: 1.1 G/DL
ALP SERPL-CCNC: 121 U/L (ref 39–117)
ALT SERPL W P-5'-P-CCNC: 5 U/L (ref 1–33)
ANION GAP SERPL CALCULATED.3IONS-SCNC: 12.3 MMOL/L (ref 5–15)
AST SERPL-CCNC: 14 U/L (ref 1–32)
BILIRUB SERPL-MCNC: 0.2 MG/DL (ref 0–1.2)
BUN SERPL-MCNC: 7 MG/DL (ref 6–20)
BUN/CREAT SERPL: 13.2 (ref 7–25)
CALCIUM SPEC-SCNC: 9.1 MG/DL (ref 8.6–10.5)
CHLORIDE SERPL-SCNC: 104 MMOL/L (ref 98–107)
CO2 SERPL-SCNC: 19.7 MMOL/L (ref 22–29)
CREAT SERPL-MCNC: 0.53 MG/DL (ref 0.57–1)
CREAT UR-MCNC: 184.1 MG/DL
DEPRECATED RDW RBC AUTO: 40.8 FL (ref 37–54)
EGFRCR SERPLBLD CKD-EPI 2021: 127.8 ML/MIN/1.73
ERYTHROCYTE [DISTWIDTH] IN BLOOD BY AUTOMATED COUNT: 13.2 % (ref 12.3–15.4)
GLOBULIN UR ELPH-MCNC: 3.1 GM/DL
GLUCOSE SERPL-MCNC: 119 MG/DL (ref 65–99)
GLUCOSE UR STRIP-MCNC: NEGATIVE MG/DL
HCT VFR BLD AUTO: 29.1 % (ref 34–46.6)
HGB BLD-MCNC: 9.2 G/DL (ref 12–15.9)
MCH RBC QN AUTO: 26.4 PG (ref 26.6–33)
MCHC RBC AUTO-ENTMCNC: 31.6 G/DL (ref 31.5–35.7)
MCV RBC AUTO: 83.6 FL (ref 79–97)
PLATELET # BLD AUTO: 281 10*3/MM3 (ref 140–450)
PMV BLD AUTO: 10.1 FL (ref 6–12)
POTASSIUM SERPL-SCNC: 3.8 MMOL/L (ref 3.5–5.2)
PROT ?TM UR-MCNC: 30.1 MG/DL
PROT SERPL-MCNC: 6.5 G/DL (ref 6–8.5)
PROT UR STRIP-MCNC: NEGATIVE MG/DL
PROT/CREAT UR: 0.16 MG/G{CREAT}
RBC # BLD AUTO: 3.48 10*6/MM3 (ref 3.77–5.28)
SODIUM SERPL-SCNC: 136 MMOL/L (ref 136–145)
WBC NRBC COR # BLD: 7.79 10*3/MM3 (ref 3.4–10.8)

## 2022-05-11 PROCEDURE — 84156 ASSAY OF PROTEIN URINE: CPT | Performed by: OBSTETRICS & GYNECOLOGY

## 2022-05-11 PROCEDURE — 87081 CULTURE SCREEN ONLY: CPT | Performed by: OBSTETRICS & GYNECOLOGY

## 2022-05-11 PROCEDURE — 59426 ANTEPARTUM CARE ONLY: CPT | Performed by: OBSTETRICS & GYNECOLOGY

## 2022-05-11 PROCEDURE — 85027 COMPLETE CBC AUTOMATED: CPT | Performed by: OBSTETRICS & GYNECOLOGY

## 2022-05-11 PROCEDURE — 82570 ASSAY OF URINE CREATININE: CPT | Performed by: OBSTETRICS & GYNECOLOGY

## 2022-05-11 PROCEDURE — 59025 FETAL NON-STRESS TEST: CPT | Performed by: OBSTETRICS & GYNECOLOGY

## 2022-05-11 PROCEDURE — 59025 FETAL NON-STRESS TEST: CPT

## 2022-05-11 PROCEDURE — 80053 COMPREHEN METABOLIC PANEL: CPT | Performed by: OBSTETRICS & GYNECOLOGY

## 2022-05-11 PROCEDURE — G0463 HOSPITAL OUTPT CLINIC VISIT: HCPCS

## 2022-05-11 NOTE — NON STRESS TEST
Obstetrical Non-stress Test Interpretation     Name:  Deborah WILLSON  MRN: [id number]    32 y.o. female  at 36w4d    Indication: NST for elevated blood pressure, sent from office       Fetal Assessment  Fetal Movement: active  Fetal HR Assessment Method: external  Fetal HR (beats/min): 155  Fetal HR Baseline: normal range  Fetal HR Variability: moderate (amplitude range 6 to 25 bpm)  Fetal HR Accelerations: greater than/equal to 15 bpm, lasting at least 15 seconds  Fetal HR Decelerations: absent  Sinusoidal Pattern Present: absent    Patient Vitals for the past 24 hrs:   BP Pulse Resp   22 1650 136/89 114 --   22 1640 138/97 120 --   22 1630 141/96 -- --   22 1620 135/92 114 18       Reason for test: OB Triage, Hypertension  Date of Test: 2022  Time frame of test: 8419-6581  RN NST Interpretation: Reactive    Dr Little notified of patient arrival. Sent from Dr. Hood for labs due to elevated blood pressure. Today is first instance of elevated blood pressure. Patient states she has had headache and floaters in vision yesterday and today. Doesn't have headache currently. All blood pressures and labs reviewed with Dr. Little. Patient to follow up with NST on Friday and off work until delivery. Discharge order received.     Brooke Tavares RN  2022  16:55 EDT

## 2022-05-13 ENCOUNTER — HOSPITAL ENCOUNTER (OUTPATIENT)
Dept: LABOR AND DELIVERY | Facility: HOSPITAL | Age: 32
Discharge: HOME | End: 2022-05-13

## 2022-05-13 ENCOUNTER — HOSPITAL ENCOUNTER (OUTPATIENT)
Facility: HOSPITAL | Age: 32
Discharge: HOME | End: 2022-05-13
Attending: OBSTETRICS & GYNECOLOGY | Admitting: OBSTETRICS & GYNECOLOGY

## 2022-05-13 VITALS
WEIGHT: 195 LBS | SYSTOLIC BLOOD PRESSURE: 133 MMHG | TEMPERATURE: 98.2 F | RESPIRATION RATE: 16 BRPM | BODY MASS INDEX: 30.61 KG/M2 | HEIGHT: 67 IN | HEART RATE: 119 BPM | DIASTOLIC BLOOD PRESSURE: 87 MMHG

## 2022-05-13 LAB — BACTERIA SPEC AEROBE CULT: NORMAL

## 2022-05-13 PROCEDURE — 59025 FETAL NON-STRESS TEST: CPT | Performed by: OBSTETRICS & GYNECOLOGY

## 2022-05-13 PROCEDURE — 59025 FETAL NON-STRESS TEST: CPT

## 2022-05-13 NOTE — NON STRESS TEST
"Obstetrical Non-stress Test Interpretation     Name:  Deborah WILLSON  MRN: [id number]    32 y.o. female  at 36w6d    Indication: elevated bp      Fetal Assessment  Fetal Movement: active  Fetal HR Assessment Method: external  Fetal HR (beats/min): 145  Fetal HR Baseline: normal range  Fetal HR Variability: moderate (amplitude range 6 to 25 bpm)  Fetal HR Accelerations: greater than/equal to 15 bpm, lasting at least 15 seconds  Fetal HR Decelerations: absent    /87 (BP Location: Right arm, Patient Position: Sitting)   Pulse 119   Temp 98.2 °F (36.8 °C) (Oral)   Resp 16   Ht 170.2 cm (67\")   Wt 88.5 kg (195 lb)   LMP 2021   BMI 30.54 kg/m²     Reason for test:  (nst for elevated bp)  Date of Test: 2022  Time frame of test: 3941-6861  RN NST Interpretation: Reactive    Patient unsure if nst should be continued. nst scheduled for next week 22. Told patient to discuss plan of care at Tuesday office visit.     Nicole Chisholm  2022  12:41 EDT  "

## 2022-05-17 ENCOUNTER — PREP FOR SURGERY (OUTPATIENT)
Dept: OTHER | Facility: HOSPITAL | Age: 32
End: 2022-05-17

## 2022-05-17 ENCOUNTER — HOSPITAL ENCOUNTER (OUTPATIENT)
Dept: LABOR AND DELIVERY | Facility: HOSPITAL | Age: 32
Discharge: HOME | End: 2022-05-17

## 2022-05-17 ENCOUNTER — ROUTINE PRENATAL (OUTPATIENT)
Dept: OBSTETRICS AND GYNECOLOGY | Facility: CLINIC | Age: 32
End: 2022-05-17

## 2022-05-17 ENCOUNTER — HOSPITAL ENCOUNTER (OUTPATIENT)
Facility: HOSPITAL | Age: 32
Discharge: HOME | End: 2022-05-17
Attending: OBSTETRICS & GYNECOLOGY | Admitting: OBSTETRICS & GYNECOLOGY

## 2022-05-17 VITALS — DIASTOLIC BLOOD PRESSURE: 89 MMHG | SYSTOLIC BLOOD PRESSURE: 137 MMHG | WEIGHT: 196 LBS | BODY MASS INDEX: 30.7 KG/M2

## 2022-05-17 VITALS
SYSTOLIC BLOOD PRESSURE: 127 MMHG | RESPIRATION RATE: 20 BRPM | DIASTOLIC BLOOD PRESSURE: 83 MMHG | TEMPERATURE: 98.7 F | HEART RATE: 106 BPM | OXYGEN SATURATION: 98 %

## 2022-05-17 DIAGNOSIS — Z87.59 H/O RAPID LABOR: ICD-10-CM

## 2022-05-17 DIAGNOSIS — O13.3 GESTATIONAL HYPERTENSION, THIRD TRIMESTER: ICD-10-CM

## 2022-05-17 DIAGNOSIS — Z34.80 SUPERVISION OF OTHER NORMAL PREGNANCY, ANTEPARTUM: Primary | ICD-10-CM

## 2022-05-17 DIAGNOSIS — Z87.59 HISTORY OF RETAINED PLACENTA: ICD-10-CM

## 2022-05-17 DIAGNOSIS — Z87.59 H/O RAPID LABOR: Primary | ICD-10-CM

## 2022-05-17 DIAGNOSIS — K59.01 SLOW TRANSIT CONSTIPATION: ICD-10-CM

## 2022-05-17 LAB
GLUCOSE UR STRIP-MCNC: NEGATIVE MG/DL
PROT UR STRIP-MCNC: ABNORMAL MG/DL
SARS-COV-2 RNA PNL SPEC NAA+PROBE: NOT DETECTED

## 2022-05-17 PROCEDURE — 59025 FETAL NON-STRESS TEST: CPT | Performed by: OBSTETRICS & GYNECOLOGY

## 2022-05-17 PROCEDURE — U0004 COV-19 TEST NON-CDC HGH THRU: HCPCS | Performed by: OBSTETRICS & GYNECOLOGY

## 2022-05-17 PROCEDURE — 0502F SUBSEQUENT PRENATAL CARE: CPT | Performed by: OBSTETRICS & GYNECOLOGY

## 2022-05-17 PROCEDURE — 59025 FETAL NON-STRESS TEST: CPT

## 2022-05-17 NOTE — ASSESSMENT & PLAN NOTE
Patient's blood pressure is normal here today, but borderline.  She had elevated blood pressures at her last office visit as well as in triage.  The patient reports she is having diastolics in the 90s at home.  I have strong suspicion she may have gestational hypertension.  We have discussed implication of this including recommendation for delivery at 37 weeks or greater.  The patient would like to proceed with delivery.  The risks, benefits, and alternatives of induction of labor have been discussed the patient, including the risk of failed induction of labor, an increased risk of  delivery, and increased risk of fetal heart rate problems during the induction that may lead to emergent  delivery.  We have discussed the risks of medication use for induction and augmentation of labor including prostaglandins, such as Cytotec and other agents such as oxytocin.  We have discussed that drugs such as this have warnings for use in pregnancy, however, have long established safety and efficacy for induction of labor or augmentation of labor when used appropriately.  We have discussed the use of a cervical ripening balloon for induction of labor and/or cervical ripening.  We have discussed the risks, benefits, and alternatives to this method of induction of labor we've also discussed that [employer] of [employer] gyne[employer] endorses the use of drugs such as these for induction of labor and augmentation of labor.  The patient expressed her understanding of these risks and wishes to proceed.

## 2022-05-17 NOTE — NON STRESS TEST
Obstetrical Non-stress Test Interpretation     Name:  Deborah WILLSON  MRN: [id number]    32 y.o. female  at 37w3d    Indication: htn      Fetal Assessment  Fetal Movement: active  Fetal HR Assessment Method: external  Fetal HR (beats/min): 150  Fetal HR Baseline: normal range  Fetal HR Variability: moderate (amplitude range 6 to 25 bpm)  Fetal HR Accelerations: greater than/equal to 15 bpm, lasting at least 15 seconds  Fetal HR Decelerations: absent  Sinusoidal Pattern Present: absent  Fetal HR Tracing Category: Category I    /83   Pulse 106   Temp 98.7 °F (37.1 °C)   Resp 20   LMP 2021   SpO2 98%     Reason for test: Hypertension  Date of Test: 2022  Time frame of test:   RN NST Interpretation: Reactive      Za Escobar RN  2022  10:17 EDT

## 2022-05-17 NOTE — PROGRESS NOTES
OB FOLLOW UP    CC: Scheduled OB routine FU     Prenatal care complicated by:   Patient Active Problem List   Diagnosis   • Supervision of other normal pregnancy, antepartum   • History of retained placenta   • H/O rapid labor   • Gastritis   • GERD (gastroesophageal reflux disease)   • Slow transit constipation   • Irritable bowel syndrome   • Gestational hypertension, third trimester       Subjective:   Patient has: No complaints, No leaking fluid, No vaginal bleeding, No contractions, Adequate FM    Objective:  Urine glucose/protein- see flow sheet      /89   Wt 88.9 kg (196 lb)   LMP 09/02/2021   BMI 30.70 kg/m²   See OB flow for LE edema, and cvx exam if applicable  FHT: 148 BPM   Uterine Size: 39 cm      Assessment and Plan:  Diagnoses and all orders for this visit:    1. Supervision of other normal pregnancy, antepartum (Primary)  Overview:  EDC: 6/4/2022    Declines prenatal genetic testing    Anatomy u/s: Limited right ventricular outflow tract and face.  Follow-up ultrasound normal anatomy.    Covid Vaccine - Complete  Flu vaccine - Recommended  Tdap vaccine -    Assessment & Plan:  GBS is negative.  Continue prenatal vitamins  Fetal kick counts  Labor instructions    Orders:  -     POC Urinalysis Dipstick    2. Slow transit constipation    3. H/O rapid labor    4. History of retained placenta    5. Gestational hypertension, third trimester  Assessment & Plan:  Patient's blood pressure is normal here today, but borderline.  She had elevated blood pressures at her last office visit as well as in triage.  The patient reports she is having diastolics in the 90s at home.  I have strong suspicion she may have gestational hypertension.  We have discussed implication of this including recommendation for delivery at 37 weeks or greater.  The patient would like to proceed with delivery.  The risks, benefits, and alternatives of induction of labor have been discussed the patient, including the risk of  failed induction of labor, an increased risk of  delivery, and increased risk of fetal heart rate problems during the induction that may lead to emergent  delivery.  We have discussed the risks of medication use for induction and augmentation of labor including prostaglandins, such as Cytotec and other agents such as oxytocin.  We have discussed that drugs such as this have warnings for use in pregnancy, however, have long established safety and efficacy for induction of labor or augmentation of labor when used appropriately.  We have discussed the use of a cervical ripening balloon for induction of labor and/or cervical ripening.  We have discussed the risks, benefits, and alternatives to this method of induction of labor we've also discussed that [employer] of obstetrics and gyne[employer] endorses the use of drugs such as these for induction of labor and augmentation of labor.  The patient expressed her understanding of these risks and wishes to proceed.          37w3d  Reassuring pregnancy progress    Counseling: OB precautions, leaking, VB, yani bernal vs PTL/Labor  FKC  HTN precautions, HA, vision change, RUQ/epigastric pain, edema    Questions answered    No follow-ups on file.      Guzman Engle MD  2022

## 2022-05-19 ENCOUNTER — HOSPITAL ENCOUNTER (INPATIENT)
Facility: HOSPITAL | Age: 32
LOS: 2 days | Discharge: HOME | End: 2022-05-21
Attending: OBSTETRICS & GYNECOLOGY | Admitting: OBSTETRICS & GYNECOLOGY

## 2022-05-19 ENCOUNTER — HOSPITAL ENCOUNTER (OUTPATIENT)
Dept: LABOR AND DELIVERY | Facility: HOSPITAL | Age: 32
Discharge: HOME | End: 2022-05-19

## 2022-05-19 ENCOUNTER — ANESTHESIA (OUTPATIENT)
Dept: LABOR AND DELIVERY | Facility: HOSPITAL | Age: 32
End: 2022-05-19

## 2022-05-19 ENCOUNTER — ANESTHESIA EVENT (OUTPATIENT)
Dept: LABOR AND DELIVERY | Facility: HOSPITAL | Age: 32
End: 2022-05-19

## 2022-05-19 PROBLEM — E55.9 VITAMIN D DEFICIENCY: Status: ACTIVE | Noted: 2022-05-19

## 2022-05-19 PROBLEM — E53.8 VITAMIN B12 DEFICIENCY: Status: ACTIVE | Noted: 2022-05-19

## 2022-05-19 PROBLEM — F41.9 ANXIETY: Status: ACTIVE | Noted: 2022-05-19

## 2022-05-19 PROBLEM — K29.70 GASTRITIS: Chronic | Status: RESOLVED | Noted: 2021-10-26 | Resolved: 2022-05-19

## 2022-05-19 PROBLEM — Z37.9 NORMAL LABOR: Status: ACTIVE | Noted: 2022-05-19

## 2022-05-19 PROBLEM — Z34.90 ENCOUNTER FOR INDUCTION OF LABOR: Status: ACTIVE | Noted: 2022-05-19

## 2022-05-19 PROBLEM — Z34.80 SUPERVISION OF OTHER NORMAL PREGNANCY, ANTEPARTUM: Status: RESOLVED | Noted: 2021-10-26 | Resolved: 2022-05-19

## 2022-05-19 LAB
ABO GROUP BLD: NORMAL
ALBUMIN SERPL-MCNC: 3.6 G/DL (ref 3.5–5.2)
ALBUMIN/GLOB SERPL: 1.1 G/DL
ALP SERPL-CCNC: 129 U/L (ref 39–117)
ALT SERPL W P-5'-P-CCNC: 6 U/L (ref 1–33)
ANION GAP SERPL CALCULATED.3IONS-SCNC: 15.7 MMOL/L (ref 5–15)
AST SERPL-CCNC: 17 U/L (ref 1–32)
BASE EXCESS BLDCOA CALC-SCNC: -2.7 MMOL/L
BASE EXCESS BLDCOV CALC-SCNC: -3 MMOL/L
BASOPHILS # BLD AUTO: 0.02 10*3/MM3 (ref 0–0.2)
BASOPHILS NFR BLD AUTO: 0.2 % (ref 0–1.5)
BILIRUB SERPL-MCNC: 0.2 MG/DL (ref 0–1.2)
BLD GP AB SCN SERPL QL: NEGATIVE
BUN SERPL-MCNC: 6 MG/DL (ref 6–20)
BUN/CREAT SERPL: 12.5 (ref 7–25)
CALCIUM SPEC-SCNC: 8.9 MG/DL (ref 8.6–10.5)
CHLORIDE SERPL-SCNC: 103 MMOL/L (ref 98–107)
CO2 SERPL-SCNC: 17.3 MMOL/L (ref 22–29)
CREAT SERPL-MCNC: 0.48 MG/DL (ref 0.57–1)
CREAT UR-MCNC: 212.9 MG/DL
DEPRECATED RDW RBC AUTO: 41.5 FL (ref 37–54)
EGFRCR SERPLBLD CKD-EPI 2021: 130.9 ML/MIN/1.73
EOSINOPHIL # BLD AUTO: 0.1 10*3/MM3 (ref 0–0.4)
EOSINOPHIL NFR BLD AUTO: 1.2 % (ref 0.3–6.2)
ERYTHROCYTE [DISTWIDTH] IN BLOOD BY AUTOMATED COUNT: 13.7 % (ref 12.3–15.4)
GLOBULIN UR ELPH-MCNC: 3.4 GM/DL
GLUCOSE SERPL-MCNC: 71 MG/DL (ref 65–99)
HCO3 BLDCOA-SCNC: 22.9 MMOL/L
HCO3 BLDCOV-SCNC: 21.9 MMOL/L
HCT VFR BLD AUTO: 30 % (ref 34–46.6)
HGB BLD-MCNC: 9.5 G/DL (ref 12–15.9)
IMM GRANULOCYTES # BLD AUTO: 0.06 10*3/MM3 (ref 0–0.05)
IMM GRANULOCYTES NFR BLD AUTO: 0.7 % (ref 0–0.5)
LYMPHOCYTES # BLD AUTO: 2.09 10*3/MM3 (ref 0.7–3.1)
LYMPHOCYTES NFR BLD AUTO: 26.1 % (ref 19.6–45.3)
MCH RBC QN AUTO: 26.2 PG (ref 26.6–33)
MCHC RBC AUTO-ENTMCNC: 31.7 G/DL (ref 31.5–35.7)
MCV RBC AUTO: 82.9 FL (ref 79–97)
MONOCYTES # BLD AUTO: 0.52 10*3/MM3 (ref 0.1–0.9)
MONOCYTES NFR BLD AUTO: 6.5 % (ref 5–12)
NEUTROPHILS NFR BLD AUTO: 5.22 10*3/MM3 (ref 1.7–7)
NEUTROPHILS NFR BLD AUTO: 65.3 % (ref 42.7–76)
NRBC BLD AUTO-RTO: 0 /100 WBC (ref 0–0.2)
PCO2 BLDCOA: 42.7 MMHG (ref 33–49)
PCO2 BLDCOV: 38.9 MM HG (ref 28–40)
PH BLDCOA: 7.35 PH UNITS (ref 7.21–7.31)
PH BLDCOV: 7.37 PH UNITS (ref 7.31–7.37)
PLATELET # BLD AUTO: 268 10*3/MM3 (ref 140–450)
PMV BLD AUTO: 10.4 FL (ref 6–12)
PO2 BLDCOA: <40.5 MMHG
PO2 BLDCOV: <40.5 MM HG (ref 21–31)
POTASSIUM SERPL-SCNC: 3.9 MMOL/L (ref 3.5–5.2)
PROT ?TM UR-MCNC: 28.5 MG/DL
PROT SERPL-MCNC: 7 G/DL (ref 6–8.5)
PROT/CREAT UR: 0.13 MG/G{CREAT}
RBC # BLD AUTO: 3.62 10*6/MM3 (ref 3.77–5.28)
RH BLD: POSITIVE
SODIUM SERPL-SCNC: 136 MMOL/L (ref 136–145)
T&S EXPIRATION DATE: NORMAL
WBC NRBC COR # BLD: 8.01 10*3/MM3 (ref 3.4–10.8)

## 2022-05-19 PROCEDURE — 10907ZC DRAINAGE OF AMNIOTIC FLUID, THERAPEUTIC FROM PRODUCTS OF CONCEPTION, VIA NATURAL OR ARTIFICIAL OPENING: ICD-10-PCS | Performed by: OBSTETRICS & GYNECOLOGY

## 2022-05-19 PROCEDURE — 82570 ASSAY OF URINE CREATININE: CPT | Performed by: OBSTETRICS & GYNECOLOGY

## 2022-05-19 PROCEDURE — 82803 BLOOD GASES ANY COMBINATION: CPT | Performed by: OBSTETRICS & GYNECOLOGY

## 2022-05-19 PROCEDURE — C1755 CATHETER, INTRASPINAL: HCPCS | Performed by: ANESTHESIOLOGY

## 2022-05-19 PROCEDURE — 25010000002 FENTANYL CITRATE (PF) 50 MCG/ML SOLUTION: Performed by: ANESTHESIOLOGY

## 2022-05-19 PROCEDURE — 86900 BLOOD TYPING SEROLOGIC ABO: CPT | Performed by: OBSTETRICS & GYNECOLOGY

## 2022-05-19 PROCEDURE — 84156 ASSAY OF PROTEIN URINE: CPT | Performed by: OBSTETRICS & GYNECOLOGY

## 2022-05-19 PROCEDURE — 85025 COMPLETE CBC W/AUTO DIFF WBC: CPT | Performed by: OBSTETRICS & GYNECOLOGY

## 2022-05-19 PROCEDURE — 86850 RBC ANTIBODY SCREEN: CPT | Performed by: OBSTETRICS & GYNECOLOGY

## 2022-05-19 PROCEDURE — 3E033VJ INTRODUCTION OF OTHER HORMONE INTO PERIPHERAL VEIN, PERCUTANEOUS APPROACH: ICD-10-PCS | Performed by: OBSTETRICS & GYNECOLOGY

## 2022-05-19 PROCEDURE — 86901 BLOOD TYPING SEROLOGIC RH(D): CPT | Performed by: OBSTETRICS & GYNECOLOGY

## 2022-05-19 PROCEDURE — 25010000002 ROPIVACAINE PER 1 MG: Performed by: ANESTHESIOLOGY

## 2022-05-19 PROCEDURE — S0260 H&P FOR SURGERY: HCPCS | Performed by: OBSTETRICS & GYNECOLOGY

## 2022-05-19 PROCEDURE — 80053 COMPREHEN METABOLIC PANEL: CPT | Performed by: OBSTETRICS & GYNECOLOGY

## 2022-05-19 PROCEDURE — 59410 OBSTETRICAL CARE: CPT | Performed by: OBSTETRICS & GYNECOLOGY

## 2022-05-19 RX ORDER — DOCUSATE SODIUM 100 MG/1
100 CAPSULE, LIQUID FILLED ORAL 2 TIMES DAILY
Status: DISCONTINUED | OUTPATIENT
Start: 2022-05-19 | End: 2022-05-21 | Stop reason: HOSPADM

## 2022-05-19 RX ORDER — OXYTOCIN/0.9 % SODIUM CHLORIDE 30/500 ML
125 PLASTIC BAG, INJECTION (ML) INTRAVENOUS ONCE
Status: DISCONTINUED | OUTPATIENT
Start: 2022-05-19 | End: 2022-05-19 | Stop reason: HOSPADM

## 2022-05-19 RX ORDER — ONDANSETRON 4 MG/1
4 TABLET, FILM COATED ORAL EVERY 6 HOURS PRN
Status: DISCONTINUED | OUTPATIENT
Start: 2022-05-19 | End: 2022-05-19 | Stop reason: HOSPADM

## 2022-05-19 RX ORDER — OXYTOCIN/0.9 % SODIUM CHLORIDE 30/500 ML
2-20 PLASTIC BAG, INJECTION (ML) INTRAVENOUS
Status: DISCONTINUED | OUTPATIENT
Start: 2022-05-19 | End: 2022-05-19 | Stop reason: HOSPADM

## 2022-05-19 RX ORDER — TRISODIUM CITRATE DIHYDRATE AND CITRIC ACID MONOHYDRATE 500; 334 MG/5ML; MG/5ML
30 SOLUTION ORAL ONCE AS NEEDED
Status: DISCONTINUED | OUTPATIENT
Start: 2022-05-19 | End: 2022-05-19 | Stop reason: HOSPADM

## 2022-05-19 RX ORDER — LIDOCAINE HYDROCHLORIDE AND EPINEPHRINE 15; 5 MG/ML; UG/ML
INJECTION, SOLUTION EPIDURAL
Status: COMPLETED | OUTPATIENT
Start: 2022-05-19 | End: 2022-05-19

## 2022-05-19 RX ORDER — MORPHINE SULFATE 2 MG/ML
2 INJECTION, SOLUTION INTRAMUSCULAR; INTRAVENOUS
Status: DISCONTINUED | OUTPATIENT
Start: 2022-05-19 | End: 2022-05-19 | Stop reason: HOSPADM

## 2022-05-19 RX ORDER — HYDROCODONE BITARTRATE AND ACETAMINOPHEN 5; 325 MG/1; MG/1
1 TABLET ORAL EVERY 4 HOURS PRN
Status: DISCONTINUED | OUTPATIENT
Start: 2022-05-19 | End: 2022-05-19 | Stop reason: HOSPADM

## 2022-05-19 RX ORDER — ONDANSETRON 2 MG/ML
4 INJECTION INTRAMUSCULAR; INTRAVENOUS EVERY 6 HOURS PRN
Status: DISCONTINUED | OUTPATIENT
Start: 2022-05-19 | End: 2022-05-19 | Stop reason: HOSPADM

## 2022-05-19 RX ORDER — SODIUM CHLORIDE, SODIUM LACTATE, POTASSIUM CHLORIDE, CALCIUM CHLORIDE 600; 310; 30; 20 MG/100ML; MG/100ML; MG/100ML; MG/100ML
125 INJECTION, SOLUTION INTRAVENOUS CONTINUOUS
Status: DISCONTINUED | OUTPATIENT
Start: 2022-05-19 | End: 2022-05-19

## 2022-05-19 RX ORDER — PRENATAL VIT/IRON FUM/FOLIC AC 27MG-0.8MG
1 TABLET ORAL DAILY
Status: DISCONTINUED | OUTPATIENT
Start: 2022-05-19 | End: 2022-05-21 | Stop reason: HOSPADM

## 2022-05-19 RX ORDER — IBUPROFEN 600 MG/1
600 TABLET ORAL EVERY 6 HOURS PRN
Status: DISCONTINUED | OUTPATIENT
Start: 2022-05-19 | End: 2022-05-19 | Stop reason: SDUPTHER

## 2022-05-19 RX ORDER — SODIUM CHLORIDE 0.9 % (FLUSH) 0.9 %
1-10 SYRINGE (ML) INJECTION AS NEEDED
Status: DISCONTINUED | OUTPATIENT
Start: 2022-05-19 | End: 2022-05-21 | Stop reason: HOSPADM

## 2022-05-19 RX ORDER — ROPIVACAINE HYDROCHLORIDE 2 MG/ML
INJECTION, SOLUTION EPIDURAL; INFILTRATION; PERINEURAL
Status: COMPLETED | OUTPATIENT
Start: 2022-05-19 | End: 2022-05-19

## 2022-05-19 RX ORDER — ROPIVACAINE HYDROCHLORIDE 2 MG/ML
INJECTION, SOLUTION EPIDURAL; INFILTRATION; PERINEURAL
Status: COMPLETED
Start: 2022-05-19 | End: 2022-05-19

## 2022-05-19 RX ORDER — CARBOPROST TROMETHAMINE 250 UG/ML
250 INJECTION, SOLUTION INTRAMUSCULAR AS NEEDED
Status: DISCONTINUED | OUTPATIENT
Start: 2022-05-19 | End: 2022-05-19 | Stop reason: HOSPADM

## 2022-05-19 RX ORDER — OXYTOCIN/0.9 % SODIUM CHLORIDE 30/500 ML
125 PLASTIC BAG, INJECTION (ML) INTRAVENOUS ONCE
Status: DISCONTINUED | OUTPATIENT
Start: 2022-05-19 | End: 2022-05-21 | Stop reason: HOSPADM

## 2022-05-19 RX ORDER — EPHEDRINE SULFATE 50 MG/ML
5 INJECTION, SOLUTION INTRAVENOUS
Status: DISCONTINUED | OUTPATIENT
Start: 2022-05-19 | End: 2022-05-19 | Stop reason: HOSPADM

## 2022-05-19 RX ORDER — METHYLERGONOVINE MALEATE 0.2 MG/ML
200 INJECTION INTRAVENOUS ONCE AS NEEDED
Status: DISCONTINUED | OUTPATIENT
Start: 2022-05-19 | End: 2022-05-19 | Stop reason: HOSPADM

## 2022-05-19 RX ORDER — SODIUM CHLORIDE 0.9 % (FLUSH) 0.9 %
3 SYRINGE (ML) INJECTION EVERY 12 HOURS SCHEDULED
Status: DISCONTINUED | OUTPATIENT
Start: 2022-05-19 | End: 2022-05-19 | Stop reason: HOSPADM

## 2022-05-19 RX ORDER — PANTOPRAZOLE SODIUM 40 MG/1
40 TABLET, DELAYED RELEASE ORAL EVERY MORNING
Refills: 3 | Status: DISCONTINUED | OUTPATIENT
Start: 2022-05-19 | End: 2022-05-19 | Stop reason: HOSPADM

## 2022-05-19 RX ORDER — MISOPROSTOL 200 UG/1
200 TABLET ORAL
Status: COMPLETED | OUTPATIENT
Start: 2022-05-19 | End: 2022-05-19

## 2022-05-19 RX ORDER — ONDANSETRON 4 MG/1
4 TABLET, FILM COATED ORAL EVERY 8 HOURS PRN
Status: DISCONTINUED | OUTPATIENT
Start: 2022-05-19 | End: 2022-05-21 | Stop reason: HOSPADM

## 2022-05-19 RX ORDER — MISOPROSTOL 200 UG/1
800 TABLET ORAL AS NEEDED
Status: DISCONTINUED | OUTPATIENT
Start: 2022-05-19 | End: 2022-05-19 | Stop reason: HOSPADM

## 2022-05-19 RX ORDER — CALCIUM CARBONATE 200(500)MG
2 TABLET,CHEWABLE ORAL 3 TIMES DAILY PRN
Status: DISCONTINUED | OUTPATIENT
Start: 2022-05-19 | End: 2022-05-21 | Stop reason: HOSPADM

## 2022-05-19 RX ORDER — HYDROCODONE BITARTRATE AND ACETAMINOPHEN 5; 325 MG/1; MG/1
2 TABLET ORAL EVERY 4 HOURS PRN
Status: DISCONTINUED | OUTPATIENT
Start: 2022-05-19 | End: 2022-05-21 | Stop reason: HOSPADM

## 2022-05-19 RX ORDER — ACETAMINOPHEN 325 MG/1
650 TABLET ORAL EVERY 4 HOURS PRN
Status: DISCONTINUED | OUTPATIENT
Start: 2022-05-19 | End: 2022-05-19 | Stop reason: HOSPADM

## 2022-05-19 RX ORDER — SODIUM CHLORIDE 0.9 % (FLUSH) 0.9 %
10 SYRINGE (ML) INJECTION AS NEEDED
Status: DISCONTINUED | OUTPATIENT
Start: 2022-05-19 | End: 2022-05-19 | Stop reason: HOSPADM

## 2022-05-19 RX ORDER — TERBUTALINE SULFATE 1 MG/ML
0.25 INJECTION, SOLUTION SUBCUTANEOUS AS NEEDED
Status: DISCONTINUED | OUTPATIENT
Start: 2022-05-19 | End: 2022-05-19 | Stop reason: HOSPADM

## 2022-05-19 RX ORDER — BISACODYL 10 MG
10 SUPPOSITORY, RECTAL RECTAL DAILY PRN
Status: DISCONTINUED | OUTPATIENT
Start: 2022-05-20 | End: 2022-05-21 | Stop reason: HOSPADM

## 2022-05-19 RX ORDER — HYDROCODONE BITARTRATE AND ACETAMINOPHEN 5; 325 MG/1; MG/1
1 TABLET ORAL EVERY 4 HOURS PRN
Status: DISCONTINUED | OUTPATIENT
Start: 2022-05-19 | End: 2022-05-21 | Stop reason: HOSPADM

## 2022-05-19 RX ORDER — IBUPROFEN 600 MG/1
600 TABLET ORAL EVERY 6 HOURS PRN
Status: DISCONTINUED | OUTPATIENT
Start: 2022-05-19 | End: 2022-05-21 | Stop reason: HOSPADM

## 2022-05-19 RX ORDER — FENTANYL CITRATE 50 UG/ML
INJECTION, SOLUTION INTRAMUSCULAR; INTRAVENOUS
Status: COMPLETED
Start: 2022-05-19 | End: 2022-05-19

## 2022-05-19 RX ORDER — LIDOCAINE HYDROCHLORIDE 10 MG/ML
5 INJECTION, SOLUTION EPIDURAL; INFILTRATION; INTRACAUDAL; PERINEURAL AS NEEDED
Status: DISCONTINUED | OUTPATIENT
Start: 2022-05-19 | End: 2022-05-19 | Stop reason: HOSPADM

## 2022-05-19 RX ORDER — FENTANYL CITRATE 50 UG/ML
INJECTION, SOLUTION INTRAMUSCULAR; INTRAVENOUS
Status: COMPLETED | OUTPATIENT
Start: 2022-05-19 | End: 2022-05-19

## 2022-05-19 RX ADMIN — FENTANYL CITRATE 100 MCG: 50 INJECTION, SOLUTION INTRAMUSCULAR; INTRAVENOUS at 12:50

## 2022-05-19 RX ADMIN — PANTOPRAZOLE SODIUM 40 MG: 40 TABLET, DELAYED RELEASE ORAL at 07:49

## 2022-05-19 RX ADMIN — Medication 2 MILLI-UNITS/MIN: at 07:53

## 2022-05-19 RX ADMIN — MISOPROSTOL 200 MCG: 200 TABLET ORAL at 23:53

## 2022-05-19 RX ADMIN — IBUPROFEN 600 MG: 600 TABLET ORAL at 23:53

## 2022-05-19 RX ADMIN — MISOPROSTOL 200 MCG: 200 TABLET ORAL at 21:02

## 2022-05-19 RX ADMIN — WITCH HAZEL 1 PAD: 500 SOLUTION RECTAL; TOPICAL at 17:05

## 2022-05-19 RX ADMIN — LIDOCAINE HYDROCHLORIDE AND EPINEPHRINE 3 ML: 15; 5 INJECTION, SOLUTION EPIDURAL at 12:50

## 2022-05-19 RX ADMIN — ROPIVACAINE HYDROCHLORIDE 5 ML: 2 INJECTION, SOLUTION EPIDURAL; INFILTRATION; PERINEURAL at 12:50

## 2022-05-19 RX ADMIN — Medication 10 ML/HR: at 12:54

## 2022-05-19 RX ADMIN — Medication: at 17:05

## 2022-05-19 RX ADMIN — SODIUM CHLORIDE, POTASSIUM CHLORIDE, SODIUM LACTATE AND CALCIUM CHLORIDE 1000 ML: 600; 310; 30; 20 INJECTION, SOLUTION INTRAVENOUS at 12:29

## 2022-05-19 RX ADMIN — IBUPROFEN 600 MG: 600 TABLET ORAL at 17:07

## 2022-05-19 RX ADMIN — DOCUSATE SODIUM 100 MG: 100 CAPSULE, LIQUID FILLED ORAL at 21:01

## 2022-05-19 NOTE — ANESTHESIA PREPROCEDURE EVALUATION
Anesthesia Evaluation     Patient summary reviewed and Nursing notes reviewed                Airway   Mallampati: I  TM distance: >3 FB  Neck ROM: full  No difficulty expected  Dental      Pulmonary - negative pulmonary ROS and normal exam    breath sounds clear to auscultation  Cardiovascular - normal exam    Rhythm: regular  Rate: normal    (+) hypertension,       Neuro/Psych- negative ROS  GI/Hepatic/Renal/Endo    (+)  GERD, PUD,      Musculoskeletal (-) negative ROS    Abdominal    Substance History - negative use     OB/GYN    (+) Pregnant,         Other                        Anesthesia Plan    ASA 3     epidural       Anesthetic plan, all risks, benefits, and alternatives have been provided, discussed and informed consent has been obtained with: patient.        CODE STATUS:    Level Of Support Discussed With: Patient  Code Status (Patient has no pulse and is not breathing): CPR (Attempt to Resuscitate)  Medical Interventions (Patient has pulse or is breathing): Full

## 2022-05-19 NOTE — ANESTHESIA PROCEDURE NOTES
Labor Epidural    Pre-sedation assessment completed: 5/19/2022 12:43 PM    Patient reassessed immediately prior to procedure    Patient location during procedure: OB  Start Time: 5/19/2022 12:43 PM  Stop Time: 5/19/2022 12:50 PM  Performed By  Anesthesiologist: Abhijit Spann MD  Preanesthetic Checklist  Completed: patient identified, IV checked, site marked, risks and benefits discussed, surgical consent, monitors and equipment checked, pre-op evaluation and timeout performed  Prep:  Pt Position:sitting  Sterile Tech:cap, gloves, gown, mask and sterile barrier  Prep:chlorhexidine gluconate and isopropyl alcohol  Monitoring:blood pressure monitoring and continuous pulse oximetry  Epidural Block Procedure:  Approach:midline  Guidance:landmark technique  Location:L3-L4  Needle Type:Tuohy  Needle Gauge:17 G  Loss of Resistance Medium: air  Paresthesia: none  Aspiration:negative  Test Dose:negative  Medication: ropivacaine (NAROPIN) 0.2 % injection, 5 mL  fentaNYL citrate (PF) (SUBLIMAZE) injection, 100 mcg  lidocaine 1.5%-EPINEPHrine 1:200,000 (XYLOCAINE W/EPI) injection, 3 mL  Med administered at 5/19/2022 12:50 PM  Number of Attempts: 1  Post Assessment:  Dressing:occlusive dressing applied and secured with tape  Pt Tolerance:patient tolerated the procedure well with no apparent complications  Complications:no

## 2022-05-20 LAB
HCT VFR BLD AUTO: 26.7 % (ref 34–46.6)
HGB BLD-MCNC: 8.4 G/DL (ref 12–15.9)

## 2022-05-20 PROCEDURE — 85014 HEMATOCRIT: CPT | Performed by: OBSTETRICS & GYNECOLOGY

## 2022-05-20 PROCEDURE — 85018 HEMOGLOBIN: CPT | Performed by: OBSTETRICS & GYNECOLOGY

## 2022-05-20 RX ADMIN — VITAMIN A, VITAMIN C, VITAMIN D, VITAMIN E, THIAMINE, RIBOFLAVIN, NIACIN, VITAMIN B6, FOLIC ACID, VITAMIN B12, CALCIUM, IRON, ZINC, COPPER 1 TABLET: 4000; 120; 400; 22; 1.84; 3; 20; 10; 1; 12; 200; 27; 25; 2 TABLET ORAL at 09:25

## 2022-05-20 RX ADMIN — IBUPROFEN 600 MG: 600 TABLET ORAL at 06:27

## 2022-05-20 RX ADMIN — IBUPROFEN 600 MG: 600 TABLET ORAL at 21:08

## 2022-05-20 RX ADMIN — DOCUSATE SODIUM 100 MG: 100 CAPSULE, LIQUID FILLED ORAL at 09:25

## 2022-05-20 RX ADMIN — DOCUSATE SODIUM 100 MG: 100 CAPSULE, LIQUID FILLED ORAL at 21:08

## 2022-05-20 NOTE — ANESTHESIA POSTPROCEDURE EVALUATION
Patient: Deborah WILLSON    Procedure Summary     Date: 05/19/22 Room / Location:     Anesthesia Start: 1243 Anesthesia Stop: 1337    Procedure: LABOR ANALGESIA Diagnosis:     Scheduled Providers:  Provider: Abhijit Spann MD    Anesthesia Type: epidural ASA Status: 3          Anesthesia Type: epidural    Vitals  Vitals Value Taken Time   /92 05/20/22 0514   Temp 37.1 °C (98.8 °F) 05/20/22 0514   Pulse 84 05/20/22 0514   Resp 18 05/20/22 0514   SpO2 100 % 05/19/22 1300           Post Anesthesia Care and Evaluation    Patient location during evaluation: bedside  Patient participation: complete - patient participated  Level of consciousness: awake  Pain management: adequate  Airway patency: patent  Anesthetic complications: No anesthetic complications  PONV Status: none  Cardiovascular status: acceptable and stable  Respiratory status: acceptable and room air  Hydration status: acceptable  Post Neuraxial Block status: Motor and sensory function returned to baseline and No signs or symptoms of PDPH  Comments: An Anesthesiologist personally participated in the most demanding procedures (including induction and emergence if applicable) in the anesthesia plan, monitored the course of anesthesia administration at frequent intervals and remained physically present and available for immediate diagnosis and treatment of emergencies.

## 2022-05-21 VITALS
HEART RATE: 96 BPM | TEMPERATURE: 97.9 F | SYSTOLIC BLOOD PRESSURE: 133 MMHG | RESPIRATION RATE: 17 BRPM | OXYGEN SATURATION: 100 % | WEIGHT: 196 LBS | BODY MASS INDEX: 30.7 KG/M2 | DIASTOLIC BLOOD PRESSURE: 78 MMHG

## 2022-05-21 PROCEDURE — 0503F POSTPARTUM CARE VISIT: CPT | Performed by: OBSTETRICS & GYNECOLOGY

## 2022-05-21 RX ORDER — IBUPROFEN 600 MG/1
600 TABLET ORAL EVERY 6 HOURS PRN
Qty: 60 TABLET | Refills: 1 | Status: SHIPPED | OUTPATIENT
Start: 2022-05-21 | End: 2023-03-09

## 2022-05-21 RX ADMIN — WITCH HAZEL 1 PAD: 500 SOLUTION RECTAL; TOPICAL at 06:17

## 2022-05-21 RX ADMIN — DOCUSATE SODIUM 100 MG: 100 CAPSULE, LIQUID FILLED ORAL at 09:02

## 2022-05-21 RX ADMIN — VITAMIN A, VITAMIN C, VITAMIN D, VITAMIN E, THIAMINE, RIBOFLAVIN, NIACIN, VITAMIN B6, FOLIC ACID, VITAMIN B12, CALCIUM, IRON, ZINC, COPPER 1 TABLET: 4000; 120; 400; 22; 1.84; 3; 20; 10; 1; 12; 200; 27; 25; 2 TABLET ORAL at 09:02

## 2022-05-21 RX ADMIN — IBUPROFEN 600 MG: 600 TABLET ORAL at 02:53

## 2022-05-25 ENCOUNTER — TELEPHONE (OUTPATIENT)
Dept: LACTATION | Facility: HOSPITAL | Age: 32
End: 2022-05-25

## 2022-05-25 NOTE — TELEPHONE ENCOUNTER
MARITZA called to check on this patient's lactation progress. Patient states that her breasts were very full on Sunday and Monday but baby is latching much better now and empties her breasts well. She states her nipples are inverted and the nipple shield is the only way baby will latch. She is now pumping when baby is not feeding at the breast and is getting 1/2-1 oz. MARITZA discussed with her that she can come in for outpt lactation visit if she needs more guidance. She states she has the lactation dept contact information.

## 2022-05-26 NOTE — NON STRESS TEST
[facility]ht[facility]   OB NST Note    2022   Name:  Deborah WILLSON  MRN: [id number]    Subjective:  32 y.o.  at 37w5d    Indication: GHTN    NST:   Baseline: 150  Variability:   Moderate/Normal (amplitude 6-25 bpm)  Accelerations: Present (32 weeks+) 15 x 15 bpm  Decelerations: Absent   Contractions:  Not regular    NST interpretation: reactive    Documented Vitals    22 1008 22 1009   BP: 127/83    Pulse: 105 106   Resp: 20    Temp: 98.7 °F (37.1 °C)    SpO2:  98%         Lab Results (last 24 hours)     ** No results found for the last 24 hours. **             Assessment:  32 y.o.  AT 37w5d  GHTN    Plan:   OB Precautions, HTN Precautions, FKC, Keep scheduled IOL      Electronically signed by Guzman Engle MD, 22, 12:14 PM EDT.

## 2022-05-31 ENCOUNTER — POSTPARTUM VISIT (OUTPATIENT)
Dept: OBSTETRICS AND GYNECOLOGY | Facility: CLINIC | Age: 32
End: 2022-05-31

## 2022-05-31 VITALS
BODY MASS INDEX: 27.78 KG/M2 | HEART RATE: 89 BPM | DIASTOLIC BLOOD PRESSURE: 84 MMHG | HEIGHT: 67 IN | SYSTOLIC BLOOD PRESSURE: 133 MMHG | WEIGHT: 177 LBS

## 2022-05-31 PROBLEM — Z34.90 ENCOUNTER FOR INDUCTION OF LABOR: Status: RESOLVED | Noted: 2022-05-19 | Resolved: 2022-05-31

## 2022-05-31 PROBLEM — Z87.59 HISTORY OF RETAINED PLACENTA: Status: RESOLVED | Noted: 2021-10-26 | Resolved: 2022-05-31

## 2022-05-31 PROBLEM — O13.3 GESTATIONAL HYPERTENSION, THIRD TRIMESTER: Status: RESOLVED | Noted: 2022-05-17 | Resolved: 2022-05-31

## 2022-05-31 PROBLEM — K21.9 GERD (GASTROESOPHAGEAL REFLUX DISEASE): Chronic | Status: RESOLVED | Noted: 2021-10-26 | Resolved: 2022-05-31

## 2022-05-31 PROBLEM — Z87.59 H/O RAPID LABOR: Status: RESOLVED | Noted: 2021-10-26 | Resolved: 2022-05-31

## 2022-05-31 PROCEDURE — 0503F POSTPARTUM CARE VISIT: CPT | Performed by: OBSTETRICS & GYNECOLOGY

## 2022-06-02 NOTE — ASSESSMENT & PLAN NOTE
Stable postpartum course  Blood pressure remains normal off medications  Continue ibuprofen as needed for pelvic pain or cramping  Continue prenatal vitamin while breast-feeding  Reviewed birth control options.  Patient is interested in progesterone only pill and possibly has if she stops breast-feeding.

## 2022-06-02 NOTE — PROGRESS NOTES
"POSTPARTUM Follow Up Visit    CC:  Postpartum     HPI:      Antepartum or Postpartum complications: History of rapid labor and retained placenta, gestational hypertension  Delivery type:    Perineum: Intact  Feeding: Breast     Pain:  No  Vaginal Bleeding:  Yes  EPDS score: 0  Plans for BC:  Progesterone-only pill  Last PAP:   Last Completed Pap Smear          PAP SMEAR (Every 3 Years) Next due on 2021  SCANNED - PAP SMEAR                /84   Pulse 89   Ht 170.2 cm (67\")   Wt 80.3 kg (177 lb)   LMP 2021   Breastfeeding Yes   BMI 27.72 kg/m²     Physical Exam  Vitals and nursing note reviewed.   Constitutional:       General: She is not in acute distress.     Appearance: Normal appearance. She is not ill-appearing.   Abdominal:      General: Abdomen is flat. There is no distension.      Palpations: Abdomen is soft. There is no mass.      Tenderness: There is no abdominal tenderness. There is no guarding or rebound.      Hernia: No hernia is present.   Musculoskeletal:      Right lower leg: No edema.      Left lower leg: No edema.   Skin:     General: Skin is dry.      Findings: No rash.   Neurological:      Mental Status: She is alert and oriented to person, place, and time.   Psychiatric:         Mood and Affect: Mood normal.         Behavior: Behavior normal.         Thought Content: Thought content normal.         Judgment: Judgment normal.           ASSESSMENT AND PLAN:  Diagnoses and all orders for this visit:    1. Encounter for postpartum visit (Primary)  Assessment & Plan:  Stable postpartum course  Blood pressure remains normal off medications  Continue ibuprofen as needed for pelvic pain or cramping  Continue prenatal vitamin while breast-feeding  Reviewed birth control options.  Patient is interested in progesterone only pill and possibly has if she stops breast-feeding.      2.  (normal spontaneous vaginal delivery)      Counseling:  All birth control options " reviewed in detail.  R/B/A/SE/E of each wrt pts PMHx and prior BC use.  DENICE risk w hormonal BC reviewed, S/Sx to watch for discussed.  May resume normal activities  Core strengthening exercises reviewed and recommended  Kegel exercises reviewed and recommended    Follow Up:  Return in about 3 weeks (around 6/21/2022) for Recheck.    Guzman Engle MD  05/31/2022

## 2022-06-22 ENCOUNTER — POSTPARTUM VISIT (OUTPATIENT)
Dept: OBSTETRICS AND GYNECOLOGY | Facility: CLINIC | Age: 32
End: 2022-06-22

## 2022-06-22 VITALS
HEART RATE: 83 BPM | BODY MASS INDEX: 27.85 KG/M2 | SYSTOLIC BLOOD PRESSURE: 126 MMHG | WEIGHT: 177.8 LBS | DIASTOLIC BLOOD PRESSURE: 84 MMHG

## 2022-06-22 DIAGNOSIS — Z30.09 BIRTH CONTROL COUNSELING: ICD-10-CM

## 2022-06-22 PROCEDURE — 0503F POSTPARTUM CARE VISIT: CPT | Performed by: OBSTETRICS & GYNECOLOGY

## 2022-06-22 RX ORDER — ACETAMINOPHEN AND CODEINE PHOSPHATE 120; 12 MG/5ML; MG/5ML
1 SOLUTION ORAL DAILY
Qty: 28 TABLET | Refills: 12 | Status: SHIPPED | OUTPATIENT
Start: 2022-06-22 | End: 2023-06-22

## 2022-06-22 NOTE — PROGRESS NOTES
POSTPARTUM Follow Up Visit    CC:  Postpartum     HPI:      Antepartum or Postpartum complications: GHTN  Delivery type:    Perineum: Intact  Feeding: Breast     Pain:  No  Vaginal Bleeding:  No  EPDS score: 4  Plans for BC:  Progesterone-only pill  Last PAP:   Last Completed Pap Smear          PAP SMEAR (Every 3 Years) Next due on 2021  SCANNED - PAP SMEAR                /84   Pulse 83   Wt 80.6 kg (177 lb 12.8 oz)   LMP 2021   Breastfeeding Yes   BMI 27.85 kg/m²     Physical Exam  Vitals and nursing note reviewed.   Constitutional:       General: She is not in acute distress.     Appearance: Normal appearance. She is not ill-appearing.   Abdominal:      General: Abdomen is flat. There is no distension.      Palpations: Abdomen is soft.      Tenderness: There is no abdominal tenderness. There is no guarding or rebound.      Hernia: No hernia is present.   Skin:     General: Skin is warm and dry.      Findings: No rash.   Neurological:      Mental Status: She is alert and oriented to person, place, and time.   Psychiatric:         Mood and Affect: Mood normal.         Behavior: Behavior normal.         Thought Content: Thought content normal.         Judgment: Judgment normal.           ASSESSMENT AND PLAN:  Diagnoses and all orders for this visit:    1. Encounter for postpartum visit (Primary)  Assessment & Plan:  Stable postpartum course  Continue prenatal vitamins while breast-feeding  May resume all normal activities      2. Birth control counseling  -     norethindrone (MICRONOR) 0.35 MG tablet; Take 1 tablet by mouth Daily.  Dispense: 28 tablet; Refill: 12    3.  (normal spontaneous vaginal delivery)      Counseling:  All birth control options reviewed in detail.  R/B/A/SE/E of each wrt pts PMHx and prior BC use.  DENICE risk w hormonal BC reviewed, S/Sx to watch for discussed.  May resume intercourse  May resume normal activities  Core strengthening exercises reviewed  and recommended  Kegel exercises reviewed and recommended  Ok to return to work/school  Use backup contraception for 4 weeks after initiating chosen BC.      Follow Up:  Return for Annual physical.    Guzman Engle MD  06/22/2022

## 2022-06-22 NOTE — ASSESSMENT & PLAN NOTE
Stable postpartum course  Continue prenatal vitamins while breast-feeding  May resume all normal activities

## 2022-07-28 ENCOUNTER — OFFICE VISIT (OUTPATIENT)
Dept: OBSTETRICS AND GYNECOLOGY | Facility: CLINIC | Age: 32
End: 2022-07-28

## 2022-07-28 VITALS
HEART RATE: 103 BPM | WEIGHT: 177 LBS | HEIGHT: 67 IN | DIASTOLIC BLOOD PRESSURE: 70 MMHG | SYSTOLIC BLOOD PRESSURE: 116 MMHG | BODY MASS INDEX: 27.78 KG/M2

## 2022-07-28 DIAGNOSIS — Z01.419 WELL WOMAN EXAM: Primary | ICD-10-CM

## 2022-07-28 DIAGNOSIS — O92.29 POSTPARTUM MASTALGIA: ICD-10-CM

## 2022-07-28 PROCEDURE — 99395 PREV VISIT EST AGE 18-39: CPT | Performed by: NURSE PRACTITIONER

## 2022-07-28 PROCEDURE — 99212 OFFICE O/P EST SF 10 MIN: CPT | Performed by: NURSE PRACTITIONER

## 2022-07-28 NOTE — PROGRESS NOTES
"  HPI:   32 y.o..Presents for well woman exam. Contraception or HRT: Contraception:  Birth control pill, lactating  Menses:   No vaginal bleeding with ocp micronor/lactation   Pain:  None  Last pap normal   Complaints: Mild bilateral diffuse breast tenderness yesterday, improved today. Baby latching well, no nipple sores, nipple discharge or bleeding, some dryness and cracking, using nipple cream provided from hospital for dry cracked nipples    Past Medical History:   Diagnosis Date   • History of retained placenta 10/26/2021   • IBS (irritable bowel syndrome)    • Ulcer, gastric, acute       Past Surgical History:   Procedure Laterality Date   • DILATATION AND CURETTAGE     • WISDOM TOOTH EXTRACTION        Family History   Problem Relation Age of Onset   • Colon cancer Maternal Grandmother 70   • Heart disease Maternal Grandfather    • Hyperlipidemia Maternal Grandfather    • Hypertension Maternal Grandfather    • Breast cancer Neg Hx    • Ovarian cancer Neg Hx    • Uterine cancer Neg Hx      Allergies as of 2022 - Reviewed 2022   Allergen Reaction Noted   • Sulfa antibiotics Rash 10/26/2021   • Sulfamethoxazole-trimethoprim Rash 2021        PCP: does manage PMHx and preventative labs    /70   Pulse 103   Ht 170.2 cm (67\")   Wt 80.3 kg (177 lb)   BMI 27.72 kg/m²     PHYSICAL EXAM: Chaperone present   General- NAD, alert and oriented, appropriate  Psych- Normal mood, good memory  Neck- No masses, no thyroid enlargement  Lymphatic- No palpable neck, axillary, or groin nodes  CV- Regular rhythm, no murmurs  Resp- CTA to bases, no wheezes  Abdomen- Soft, non distended, non tender, no masses  Breast left-  Bilaterally symmetrical, no masses, non tender, no nipple discharge, no erythema, mildly dry and cracked   Breast right- Bilaterally symmetrical, no masses, non tender, no nipple discharge, no erythema, mildly dry and cracked  External genitalia- Normal female, no " lesions  Urethra/meatus- Normal, no masses, non tender, no prolapse  Bladder- Normal, no masses, non tender, no prolapse  Vagina- Normal, no atrophy, no lesions, no discharge, no prolapse  Cvx- Normal, no lesions, no discharge, No cervical motion tenderness  Uterus- Normal size, shape & consistency.  Non tender, mobile, & no prolapse  Adnexa- No mass, non tender  Anus/Rectum/Perineum- Not performed  Ext- No edema, no cyanosis    Skin- No lesions, no rashes, no acanthosis nigricans    ASSESSMENT and PLAN:    Diagnoses and all orders for this visit:    1. Well woman exam (Primary)    2. Postpartum mastalgia    Preventative:   BREAST HEALTH- Monthly self breast exam importance and how to reviewed. MMG and/or MRI (prn) reviewed per society guidelines and her individual history. Screen: Not medically needed  CERVICAL CANCER Screening- Reviewed current ASCCP guidelines for screening w and wo cotest HPV, age specific.  Screen: Already up to date  COLON CANCER Screening- Reviewed current medical society guidelines and options.  Screen:  Not medically needed  VACCINATIONS Recommended: Vaccination are up to date.  Importance discussed, risk being unvaccinated reviewed.  Questions answered  Follow up PCP/Specialist PMHx and Labs  Myriad: Does not qualify.  All BIRTH CONTROL options R/B/A/SE/E of each reviewed in detail.  continue micronor  The bilateral breast pain is improving, discussed conservative management with well fitted support bra, alternating warm and cold compresses, trial of tylenol/ibuprofen per package instructions, limit caffeine, effective milk removal, continue breast feeding 8-12 times per day to promote effective milk removal, breast massage, referral to lactation specialist if difficulty with latching. If the breast pain persists or worsen, swelling, redness or nipple discharge, fu for additional evaluation and treatment.     She understands the importance of having any ordered tests to be performed in a  timely fashion.  The risks of not performing them include, but are not limited to, advanced cancer stages, bone loss from osteoporosis and/or subsequent increase in morbidity and/or mortality.  She is encouraged to review her results online and/or contact or office if she has questions.     Follow Up:  Return if symptoms worsen or fail to improve.        Lisa Leon, APRN  07/28/2022

## 2023-05-23 PROCEDURE — 87081 CULTURE SCREEN ONLY: CPT

## 2023-05-28 DIAGNOSIS — Z30.09 BIRTH CONTROL COUNSELING: ICD-10-CM

## 2023-05-30 RX ORDER — ACETAMINOPHEN AND CODEINE PHOSPHATE 120; 12 MG/5ML; MG/5ML
SOLUTION ORAL
Qty: 84 TABLET | Refills: 0 | Status: SHIPPED | OUTPATIENT
Start: 2023-05-30

## 2023-08-03 ENCOUNTER — OFFICE VISIT (OUTPATIENT)
Dept: OBSTETRICS AND GYNECOLOGY | Facility: CLINIC | Age: 33
End: 2023-08-03
Payer: COMMERCIAL

## 2023-08-03 VITALS
SYSTOLIC BLOOD PRESSURE: 119 MMHG | BODY MASS INDEX: 28.88 KG/M2 | HEIGHT: 67 IN | WEIGHT: 184 LBS | DIASTOLIC BLOOD PRESSURE: 84 MMHG | HEART RATE: 79 BPM

## 2023-08-03 DIAGNOSIS — Z30.41 ENCOUNTER FOR SURVEILLANCE OF CONTRACEPTIVE PILLS: ICD-10-CM

## 2023-08-03 DIAGNOSIS — Z01.419 WELL WOMAN EXAM: Primary | ICD-10-CM

## 2023-08-03 DIAGNOSIS — Z30.09 BIRTH CONTROL COUNSELING: ICD-10-CM

## 2023-08-03 RX ORDER — ACETAMINOPHEN AND CODEINE PHOSPHATE 120; 12 MG/5ML; MG/5ML
1 SOLUTION ORAL DAILY
Qty: 84 TABLET | Refills: 3 | Status: SHIPPED | OUTPATIENT
Start: 2023-08-03

## 2024-06-19 NOTE — PROGRESS NOTES
Chief Complaint:  Scheduled OB visit    HPI: 31 y.o.  at 12w4d   Complains of nausea, fatigue during the day with Unisom  Negative baby movement    Vitals:    21 1011   BP: 129/82   Weight: 83.7 kg (184 lb 9.6 oz)       See OB flowsheet also for pregnancy related data.    A/P  Intrauterine pregnancy at 12w4d   Diagnoses and all orders for this visit:    1. Supervision of other normal pregnancy, antepartum (Primary)  Overview:  EDC    Declines prenatal genetic testing    Anatomy u/s    Covid Vaccine - Complete  Flu vaccine - Recommended  Tdap vaccine -    Orders:  -     POC Urinalysis Dipstick  -     US Ob 14 + Weeks Single or First Gestation; Future    2. Gastroesophageal reflux disease, unspecified whether esophagitis present  Overview:  Prilosec, 2021    Orders:  -     omeprazole (priLOSEC) 40 MG capsule; Take 1 capsule by mouth Daily.  Dispense: 90 capsule; Refill: 3    3. Nausea and vomiting of pregnancy, antepartum  Overview:  Diclegis  Zofran added     Orders:  -     ondansetron (Zofran) 4 MG tablet; Take 1 tablet by mouth Every 8 (Eight) Hours As Needed for Nausea or Vomiting.  Dispense: 40 tablet; Refill: 3      Continue prenatal vitamins.  Discussed round ligament pain    PLAN:   Return in about 4 weeks (around 2021).   Ultrasound for anatomy in 8 weeks    Ac Hood Sr., MD  2021 10:38 EST   Frontalis- 15 units on each side   - 5 units on each side   Procerus- 10 units   Occipitalis- 15 units on each side   Temporalis - 20 units on each side   Trapezius- 10 units on each side   Cervical paraspinal muscles- 10 units on left side , 5 u R side  PROCEDURE NOTE:     After informed consent signed and dated, and risks and benefits of procedure was discussed, BOTOX injection was performed   Patient tolerated procedure well w/o any complications.     Less magnitude ofh is headaches and bit less frequent. Remains on nurtec qod, prn cambia/ultram/ubrelvy help. Takes early to avoid progression. Got new mask with access dme.   Buspar tid and trazadone 1/2 150mg helping  Lot of house updates

## 2024-07-07 ENCOUNTER — HOSPITAL ENCOUNTER (EMERGENCY)
Facility: HOSPITAL | Age: 34
Discharge: HOME | End: 2024-07-08
Attending: EMERGENCY MEDICINE | Admitting: EMERGENCY MEDICINE
Payer: COMMERCIAL

## 2024-07-07 ENCOUNTER — APPOINTMENT (OUTPATIENT)
Dept: CT IMAGING | Facility: HOSPITAL | Age: 34
End: 2024-07-07
Payer: COMMERCIAL

## 2024-07-07 DIAGNOSIS — R51.9 NONINTRACTABLE HEADACHE, UNSPECIFIED CHRONICITY PATTERN, UNSPECIFIED HEADACHE TYPE: Primary | ICD-10-CM

## 2024-07-07 LAB
ALBUMIN SERPL-MCNC: 4.3 G/DL (ref 3.5–5.2)
ALBUMIN/GLOB SERPL: 1.5 G/DL
ALP SERPL-CCNC: 111 U/L (ref 39–117)
ALT SERPL W P-5'-P-CCNC: 12 U/L (ref 1–33)
ANION GAP SERPL CALCULATED.3IONS-SCNC: 12.3 MMOL/L (ref 5–15)
AST SERPL-CCNC: 15 U/L (ref 1–32)
BASOPHILS # BLD AUTO: 0.02 10*3/MM3 (ref 0–0.2)
BASOPHILS NFR BLD AUTO: 0.2 % (ref 0–1.5)
BILIRUB SERPL-MCNC: 0.2 MG/DL (ref 0–1.2)
BUN SERPL-MCNC: 10 MG/DL (ref 6–20)
BUN/CREAT SERPL: 14.9 (ref 7–25)
CALCIUM SPEC-SCNC: 9 MG/DL (ref 8.6–10.5)
CHLORIDE SERPL-SCNC: 101 MMOL/L (ref 98–107)
CK SERPL-CCNC: 61 U/L (ref 20–180)
CO2 SERPL-SCNC: 24.7 MMOL/L (ref 22–29)
CREAT SERPL-MCNC: 0.67 MG/DL (ref 0.57–1)
DEPRECATED RDW RBC AUTO: 41.4 FL (ref 37–54)
EGFRCR SERPLBLD CKD-EPI 2021: 119.3 ML/MIN/1.73
EOSINOPHIL # BLD AUTO: 0.01 10*3/MM3 (ref 0–0.4)
EOSINOPHIL NFR BLD AUTO: 0.1 % (ref 0.3–6.2)
ERYTHROCYTE [DISTWIDTH] IN BLOOD BY AUTOMATED COUNT: 12.9 % (ref 12.3–15.4)
GLOBULIN UR ELPH-MCNC: 2.9 GM/DL
GLUCOSE SERPL-MCNC: 110 MG/DL (ref 65–99)
HCG INTACT+B SERPL-ACNC: <0.5 MIU/ML
HCT VFR BLD AUTO: 37.7 % (ref 34–46.6)
HGB BLD-MCNC: 12.3 G/DL (ref 12–15.9)
IMM GRANULOCYTES # BLD AUTO: 0.03 10*3/MM3 (ref 0–0.05)
IMM GRANULOCYTES NFR BLD AUTO: 0.3 % (ref 0–0.5)
LYMPHOCYTES # BLD AUTO: 1.18 10*3/MM3 (ref 0.7–3.1)
LYMPHOCYTES NFR BLD AUTO: 13.2 % (ref 19.6–45.3)
MAGNESIUM SERPL-MCNC: 1.8 MG/DL (ref 1.6–2.6)
MCH RBC QN AUTO: 28.3 PG (ref 26.6–33)
MCHC RBC AUTO-ENTMCNC: 32.6 G/DL (ref 31.5–35.7)
MCV RBC AUTO: 86.9 FL (ref 79–97)
MONOCYTES # BLD AUTO: 0.53 10*3/MM3 (ref 0.1–0.9)
MONOCYTES NFR BLD AUTO: 5.9 % (ref 5–12)
NEUTROPHILS NFR BLD AUTO: 7.14 10*3/MM3 (ref 1.7–7)
NEUTROPHILS NFR BLD AUTO: 80.3 % (ref 42.7–76)
NRBC BLD AUTO-RTO: 0 /100 WBC (ref 0–0.2)
PLATELET # BLD AUTO: 257 10*3/MM3 (ref 140–450)
PMV BLD AUTO: 10.3 FL (ref 6–12)
POTASSIUM SERPL-SCNC: 3.8 MMOL/L (ref 3.5–5.2)
PROT SERPL-MCNC: 7.2 G/DL (ref 6–8.5)
RBC # BLD AUTO: 4.34 10*6/MM3 (ref 3.77–5.28)
SODIUM SERPL-SCNC: 138 MMOL/L (ref 136–145)
WBC NRBC COR # BLD AUTO: 8.91 10*3/MM3 (ref 3.4–10.8)

## 2024-07-07 PROCEDURE — 25810000003 SODIUM CHLORIDE 0.9 % SOLUTION: Performed by: EMERGENCY MEDICINE

## 2024-07-07 PROCEDURE — 82550 ASSAY OF CK (CPK): CPT | Performed by: EMERGENCY MEDICINE

## 2024-07-07 PROCEDURE — 87637 SARSCOV2&INF A&B&RSV AMP PRB: CPT | Performed by: EMERGENCY MEDICINE

## 2024-07-07 PROCEDURE — 96375 TX/PRO/DX INJ NEW DRUG ADDON: CPT

## 2024-07-07 PROCEDURE — 25010000002 KETOROLAC TROMETHAMINE PER 15 MG: Performed by: EMERGENCY MEDICINE

## 2024-07-07 PROCEDURE — 96374 THER/PROPH/DIAG INJ IV PUSH: CPT

## 2024-07-07 PROCEDURE — 25010000002 DIPHENHYDRAMINE PER 50 MG: Performed by: EMERGENCY MEDICINE

## 2024-07-07 PROCEDURE — 85025 COMPLETE CBC W/AUTO DIFF WBC: CPT | Performed by: EMERGENCY MEDICINE

## 2024-07-07 PROCEDURE — 25010000002 METOCLOPRAMIDE PER 10 MG: Performed by: EMERGENCY MEDICINE

## 2024-07-07 PROCEDURE — 84702 CHORIONIC GONADOTROPIN TEST: CPT | Performed by: EMERGENCY MEDICINE

## 2024-07-07 PROCEDURE — 70450 CT HEAD/BRAIN W/O DYE: CPT

## 2024-07-07 PROCEDURE — 99284 EMERGENCY DEPT VISIT MOD MDM: CPT

## 2024-07-07 PROCEDURE — 83735 ASSAY OF MAGNESIUM: CPT | Performed by: EMERGENCY MEDICINE

## 2024-07-07 PROCEDURE — 80053 COMPREHEN METABOLIC PANEL: CPT | Performed by: EMERGENCY MEDICINE

## 2024-07-07 RX ORDER — ACETAMINOPHEN 500 MG
1000 TABLET ORAL ONCE
Status: COMPLETED | OUTPATIENT
Start: 2024-07-07 | End: 2024-07-07

## 2024-07-07 RX ORDER — METOCLOPRAMIDE HYDROCHLORIDE 5 MG/ML
10 INJECTION INTRAMUSCULAR; INTRAVENOUS ONCE
Status: COMPLETED | OUTPATIENT
Start: 2024-07-07 | End: 2024-07-07

## 2024-07-07 RX ORDER — KETOROLAC TROMETHAMINE 30 MG/ML
30 INJECTION, SOLUTION INTRAMUSCULAR; INTRAVENOUS ONCE
Status: COMPLETED | OUTPATIENT
Start: 2024-07-07 | End: 2024-07-07

## 2024-07-07 RX ORDER — DIPHENHYDRAMINE HYDROCHLORIDE 50 MG/ML
12.5 INJECTION INTRAMUSCULAR; INTRAVENOUS ONCE
Status: COMPLETED | OUTPATIENT
Start: 2024-07-07 | End: 2024-07-07

## 2024-07-07 RX ADMIN — SODIUM CHLORIDE 1000 ML: 9 INJECTION, SOLUTION INTRAVENOUS at 22:50

## 2024-07-07 RX ADMIN — METOCLOPRAMIDE HYDROCHLORIDE 10 MG: 5 INJECTION INTRAMUSCULAR; INTRAVENOUS at 22:51

## 2024-07-07 RX ADMIN — DIPHENHYDRAMINE HYDROCHLORIDE 12.5 MG: 50 INJECTION, SOLUTION INTRAMUSCULAR; INTRAVENOUS at 22:51

## 2024-07-07 RX ADMIN — KETOROLAC TROMETHAMINE 30 MG: 30 INJECTION, SOLUTION INTRAMUSCULAR; INTRAVENOUS at 22:51

## 2024-07-07 RX ADMIN — ACETAMINOPHEN 1000 MG: 500 TABLET ORAL at 22:50

## 2024-07-08 VITALS
OXYGEN SATURATION: 97 % | TEMPERATURE: 98.9 F | WEIGHT: 180.56 LBS | RESPIRATION RATE: 16 BRPM | BODY MASS INDEX: 28.34 KG/M2 | HEART RATE: 106 BPM | HEIGHT: 67 IN | SYSTOLIC BLOOD PRESSURE: 117 MMHG | DIASTOLIC BLOOD PRESSURE: 77 MMHG

## 2024-07-08 LAB
BACTERIA UR QL AUTO: ABNORMAL /HPF
BILIRUB UR QL STRIP: NEGATIVE
CLARITY UR: CLEAR
COLOR UR: YELLOW
FLUAV SUBTYP SPEC NAA+PROBE: NOT DETECTED
FLUBV RNA ISLT QL NAA+PROBE: NOT DETECTED
GLUCOSE UR STRIP-MCNC: NEGATIVE MG/DL
HGB UR QL STRIP.AUTO: ABNORMAL
HYALINE CASTS UR QL AUTO: ABNORMAL /LPF
KETONES UR QL STRIP: ABNORMAL
LEUKOCYTE ESTERASE UR QL STRIP.AUTO: NEGATIVE
NITRITE UR QL STRIP: NEGATIVE
PH UR STRIP.AUTO: 6.5 [PH] (ref 5–8)
PROT UR QL STRIP: NEGATIVE
RBC # UR STRIP: ABNORMAL /HPF
REF LAB TEST METHOD: ABNORMAL
RSV RNA NPH QL NAA+NON-PROBE: NOT DETECTED
SARS-COV-2 RNA RESP QL NAA+PROBE: NOT DETECTED
SP GR UR STRIP: 1.01 (ref 1–1.03)
SQUAMOUS #/AREA URNS HPF: ABNORMAL /HPF
UROBILINOGEN UR QL STRIP: ABNORMAL
WBC # UR STRIP: ABNORMAL /HPF

## 2024-07-08 PROCEDURE — 81001 URINALYSIS AUTO W/SCOPE: CPT | Performed by: EMERGENCY MEDICINE

## 2024-07-08 RX ORDER — KETOROLAC TROMETHAMINE 10 MG/1
10 TABLET, FILM COATED ORAL EVERY 6 HOURS PRN
Qty: 15 TABLET | Refills: 0 | Status: SHIPPED | OUTPATIENT
Start: 2024-07-08

## 2024-07-08 NOTE — ED PROVIDER NOTES
Time: 10:23 PM EDT  Date of encounter:  7/7/2024  Independent Historian/Clinical History and Information was obtained by:   Patient    History is limited by: N/A    Chief Complaint: Headache      History of Present Illness:  Patient is a 34 y.o. year old female who presents to the emergency department for evaluation of headache for the past day this gotten worse.  Patient denies fever and chills.  Patient has no chest pain or shortness of breath.  Patient denies cough and hemoptysis.  Patient has no nausea, vomiting, or diarrhea.  Patient reports that she has been in the sun all week.    HPI    Patient Care Team  Primary Care Provider: Emanuel Cordoba MD    Past Medical History:     Allergies   Allergen Reactions    Sulfa Antibiotics Rash    Sulfamethoxazole-Trimethoprim Rash     Past Medical History:   Diagnosis Date    Gestational hypertension     History of retained placenta 10/26/2021    IBS (irritable bowel syndrome)     Ulcer, gastric, acute      Past Surgical History:   Procedure Laterality Date    DILATATION AND CURETTAGE  2018    WISDOM TOOTH EXTRACTION       Family History   Problem Relation Age of Onset    Colon cancer Maternal Grandmother 70    Heart disease Maternal Grandfather     Hyperlipidemia Maternal Grandfather     Hypertension Maternal Grandfather     Moyamoya disease Cousin     Breast cancer Neg Hx     Ovarian cancer Neg Hx     Uterine cancer Neg Hx     Malig Hyperthermia Neg Hx     Cervical cancer Neg Hx     Stomach cancer Neg Hx     Skin cancer Neg Hx        Home Medications:  Prior to Admission medications    Medication Sig Start Date End Date Taking? Authorizing Provider   norethindrone (MICRONOR) 0.35 MG tablet Take 1 tablet by mouth Daily. 8/3/23   Lisa Leon APRN        Social History:   Social History     Tobacco Use    Smoking status: Never    Smokeless tobacco: Never   Vaping Use    Vaping status: Never Used   Substance Use Topics    Alcohol use: Not Currently    Drug use:  "Never         Review of Systems:  Review of Systems   Constitutional:  Negative for chills and fever.   HENT:  Negative for congestion, rhinorrhea and sore throat.    Eyes:  Negative for pain and visual disturbance.   Respiratory:  Negative for apnea, cough, chest tightness and shortness of breath.    Cardiovascular:  Negative for chest pain and palpitations.   Gastrointestinal:  Negative for abdominal pain, diarrhea, nausea and vomiting.   Genitourinary:  Negative for difficulty urinating and dysuria.   Musculoskeletal:  Negative for joint swelling and myalgias.   Skin:  Negative for color change.   Neurological:  Positive for headaches. Negative for seizures.   Psychiatric/Behavioral: Negative.     All other systems reviewed and are negative.       Physical Exam:  /77   Pulse 106   Temp 98.9 °F (37.2 °C) (Oral)   Resp 16   Ht 170.2 cm (67\")   Wt 81.9 kg (180 lb 8.9 oz)   LMP 06/24/2024   SpO2 97%   BMI 28.28 kg/m²     Physical Exam  Vitals and nursing note reviewed.   Constitutional:       General: She is not in acute distress.     Appearance: Normal appearance. She is not toxic-appearing.   HENT:      Head: Normocephalic and atraumatic.      Jaw: There is normal jaw occlusion.   Eyes:      General: Lids are normal.      Extraocular Movements: Extraocular movements intact.      Conjunctiva/sclera: Conjunctivae normal.      Pupils: Pupils are equal, round, and reactive to light.   Cardiovascular:      Rate and Rhythm: Normal rate and regular rhythm.      Pulses: Normal pulses.      Heart sounds: Normal heart sounds.   Pulmonary:      Effort: Pulmonary effort is normal. No respiratory distress.      Breath sounds: Normal breath sounds. No wheezing or rhonchi.   Abdominal:      General: Abdomen is flat.      Palpations: Abdomen is soft.      Tenderness: There is no abdominal tenderness. There is no guarding or rebound.   Musculoskeletal:         General: Normal range of motion.      Cervical back: " Normal range of motion and neck supple.      Right lower leg: No edema.      Left lower leg: No edema.   Skin:     General: Skin is warm and dry.   Neurological:      Mental Status: She is alert and oriented to person, place, and time. Mental status is at baseline.   Psychiatric:         Mood and Affect: Mood normal.                  Procedures:  Procedures      Medical Decision Making:      Comorbidities that affect care:    None    External Notes reviewed:    Previous Clinic Note: Patient was last seen in clinic for flulike symptoms.      The following orders were placed and all results were independently analyzed by me:  Orders Placed This Encounter   Procedures    COVID PRE-OP / PRE-PROCEDURE SCREENING ORDER (NO ISOLATION) - Swab, Nasopharynx    COVID-19, FLU A/B, RSV PCR 1 HR TAT - Swab, Nasopharynx    CT Head Without Contrast    Comprehensive Metabolic Panel    CK    Magnesium    hCG, Quantitative, Pregnancy    CBC Auto Differential    Urinalysis With Microscopic If Indicated (No Culture) - Urine, Clean Catch    Urinalysis, Microscopic Only - Urine, Clean Catch    CBC & Differential       Medications Given in the Emergency Department:  Medications   ketorolac (TORADOL) injection 30 mg (30 mg Intravenous Given 7/7/24 2251)   acetaminophen (TYLENOL) tablet 1,000 mg (1,000 mg Oral Given 7/7/24 2250)   sodium chloride 0.9 % bolus 1,000 mL (0 mL Intravenous Stopped 7/7/24 2305)   metoclopramide (REGLAN) injection 10 mg (10 mg Intravenous Given 7/7/24 2251)   diphenhydrAMINE (BENADRYL) injection 12.5 mg (12.5 mg Intravenous Given 7/7/24 2251)        ED Course:         Labs:    Lab Results (last 24 hours)       Procedure Component Value Units Date/Time    CBC & Differential [[id number]]  (Abnormal) Collected: 07/07/24 2230    Specimen: Blood Updated: 07/07/24 2243    Narrative:      The following orders were created for panel order CBC & Differential.  Procedure                               Abnormality         Status                      ---------                               -----------         ------                     CBC Auto Differential[[id number]]        Abnormal            Final result                 Please view results for these tests on the individual orders.    Comprehensive Metabolic Panel [[id number]]  (Abnormal) Collected: 07/07/24 2230    Specimen: Blood Updated: 07/07/24 2303     Glucose 110 mg/dL      BUN 10 mg/dL      Creatinine 0.67 mg/dL      Sodium 138 mmol/L      Potassium 3.8 mmol/L      Chloride 101 mmol/L      CO2 24.7 mmol/L      Calcium 9.0 mg/dL      Total Protein 7.2 g/dL      Albumin 4.3 g/dL      ALT (SGPT) 12 U/L      AST (SGOT) 15 U/L      Alkaline Phosphatase 111 U/L      Total Bilirubin 0.2 mg/dL      Globulin 2.9 gm/dL      A/G Ratio 1.5 g/dL      BUN/Creatinine Ratio 14.9     Anion Gap 12.3 mmol/L      eGFR 119.3 mL/min/1.73     Narrative:      GFR Normal >60  Chronic Kidney Disease <60  Kidney Failure <15      CK [[id number]]  (Normal) Collected: 07/07/24 2230    Specimen: Blood Updated: 07/07/24 2303     Creatine Kinase 61 U/L     Magnesium [[id number]]  (Normal) Collected: 07/07/24 2230    Specimen: Blood Updated: 07/07/24 2303     Magnesium 1.8 mg/dL     hCG, Quantitative, Pregnancy [[id number]] Collected: 07/07/24 2230    Specimen: Blood Updated: 07/07/24 2300     HCG Quantitative <0.50 mIU/mL     Narrative:      HCG Ranges by Gestational Age    Females - non-pregnant premenopausal   </= 1mIU/mL HCG  Females - postmenopausal               </= 7mIU/mL HCG    3 Weeks       5.4   -      72 mIU/mL  4 Weeks      10.2   -     708 mIU/mL  5 Weeks       217   -   8,245 mIU/mL  6 Weeks       152   -  32,177 mIU/mL  7 Weeks     4,059   - 153,767 mIU/mL  8 Weeks    31,366   - 149,094 mIU/mL  9 Weeks    59,109   - 135,901 mIU/mL  10 Weeks   44,186   - 170,409 mIU/mL  12 Weeks   27,107   - 201,615 mIU/mL  14 Weeks   24,302   -  93,646 mIU/mL  15 Weeks   12,540   -  69,747 mIU/mL  16 Weeks    8,904   -   55,332 mIU/mL  17 Weeks    8,240   -  51,793 mIU/mL  18 Weeks    9,649   -  55,271 mIU/mL      CBC Auto Differential [[id number]]  (Abnormal) Collected: 07/07/24 2230    Specimen: Blood Updated: 07/07/24 2243     WBC 8.91 10*3/mm3      RBC 4.34 10*6/mm3      Hemoglobin 12.3 g/dL      Hematocrit 37.7 %      MCV 86.9 fL      MCH 28.3 pg      MCHC 32.6 g/dL      RDW 12.9 %      RDW-SD 41.4 fl      MPV 10.3 fL      Platelets 257 10*3/mm3      Neutrophil % 80.3 %      Lymphocyte % 13.2 %      Monocyte % 5.9 %      Eosinophil % 0.1 %      Basophil % 0.2 %      Immature Grans % 0.3 %      Neutrophils, Absolute 7.14 10*3/mm3      Lymphocytes, Absolute 1.18 10*3/mm3      Monocytes, Absolute 0.53 10*3/mm3      Eosinophils, Absolute 0.01 10*3/mm3      Basophils, Absolute 0.02 10*3/mm3      Immature Grans, Absolute 0.03 10*3/mm3      nRBC 0.0 /100 WBC     COVID PRE-OP / PRE-PROCEDURE SCREENING ORDER (NO ISOLATION) - Swab, Nasopharynx [[id number]] Collected: 07/07/24 2234    Specimen: Swab from Nasopharynx Updated: 07/07/24 2237    Narrative:      The following orders were created for panel order COVID PRE-OP / PRE-PROCEDURE SCREENING ORDER (NO ISOLATION) - Swab, Nasopharynx.  Procedure                               Abnormality         Status                     ---------                               -----------         ------                     COVID-19, FLU A/B, RSV P...[[id number]]                      In process                   Please view results for these tests on the individual orders.    COVID-19, FLU A/B, RSV PCR 1 HR TAT - Swab, Nasopharynx [[id number]] Collected: 07/07/24 2234    Specimen: Swab from Nasopharynx Updated: 07/07/24 2237    Urinalysis With Microscopic If Indicated (No Culture) - Urine, Clean Catch [[id number]]  (Abnormal) Collected: 07/08/24 0012    Specimen: Urine, Clean Catch Updated: 07/08/24 0024     Color, UA Yellow     Appearance, UA Clear     pH, UA 6.5     Specific Gravity, UA 1.011     Glucose, UA  Negative     Ketones, UA 15 mg/dL (1+)     Bilirubin, UA Negative     Blood, UA Trace     Protein, UA Negative     Leuk Esterase, UA Negative     Nitrite, UA Negative     Urobilinogen, UA 0.2 E.U./dL    Urinalysis, Microscopic Only - Urine, Clean Catch [[id number]]  (Abnormal) Collected: 07/08/24 0012    Specimen: Urine, Clean Catch Updated: 07/08/24 0024     RBC, UA 3-5 /HPF      WBC, UA 0-2 /HPF      Bacteria, UA None Seen /HPF      Squamous Epithelial Cells, UA 0-2 /HPF      Hyaline Casts, UA None Seen /LPF      Methodology Automated Microscopy             Imaging:    CT Head Without Contrast    Result Date: 7/8/2024  CT HEAD WO CONTRAST HISTORY: Headache for 12 hours. TECHNIQUE: Axial unenhanced head CT with multiplanar reformats. Radiation dose reduction techniques included automated exposure control or exposure modulation based on body size. COMPARISON: None. FINDINGS: Ventricular size and configuration are normal. No acute infarct or hemorrhage is identified. There are no masses. There is no skull fracture. There is a mucous retention cyst in the right maxillary sinus.     Normal, negative unenhanced head CT. Electronically Signed: Joe Thomson MD  7/8/2024 12:10 AM EDT  Workstation ID: [id number]       Differential Diagnosis and Discussion:    Headache: Differential diagnosis includes but is not limited to migraine, cluster headache, hypertension, tumor, subarachnoid bleeding, pseudotumor cerebri, temporal arteritis, infections, tension headache, and TMJ syndrome.    All labs were reviewed and interpreted by me.  CT scan radiology impression was interpreted by me.    MDM     Amount and/or Complexity of Data Reviewed  Clinical lab tests: reviewed  Tests in the radiology section of CPT®: reviewed       The patient presented to the emergency department with a headache.  The patient´s CBC that was reviewed and interpreted by me shows no abnormalities of critical concern. Of note, there is no anemia requiring a  blood transfusion and the platelet count is acceptable.  The patient´s CMP that was reviewed and interpretted by me shows no abnormalities of critical concern. Of note, the patient´s sodium and potassium are acceptable. The patient´s liver enzymes are unremarkable. The patient´s renal function (creatinine) is preserved. The patient has a normal anion gap.  Urinalysis is negative for hematuria, ketonuria and bacteriuria.  CT head is negative for acute intracranial abnormalities.  The patient is now resting comfortably in feels better, is alert, talkative, interactive and in no distress after ED treatment. The patient appears well and is able to tolerate PO fluids. Repeat examination is unremarkable and benign. The patient is neurologically intact, has a normal mental status, and this ambulatory in the ED. The history, exam, diagnostic testing (if any) and the patient's current condition do not suggest meningitis, stroke, sepsis, subarachnoid hemorrhage, intracranial bleeding, encephalitis, temporal arteritis or other significant pathology to warrant further testing, continued ED treatment, admission, neurological consultation, for other specialist evaluation at this point. The vital signs have been stable. The patient's condition is stable and appropriate for discharge. The patient will pursue further outpatient evaluation with the primary care physician or other designated or consulting physician as indicated in the discharge instructions.            Patient Care Considerations:    ANTIBIOTICS: I considered prescribing antibiotics as an outpatient however no bacterial focus of infection was found.      Consultants/Shared Management Plan:    None    Social Determinants of Health:    Patient is independent, reliable, and has access to care.       Disposition and Care Coordination:    Discharged: I considered escalation of care by admitting this patient to the hospital, however patient reports cessation of her headache  with ED treatment.    I have explained the patient´s condition, diagnoses and treatment plan based on the information available to me at this time. I have answered questions and addressed any concerns. The patient has a good  understanding of the patient´s diagnosis, condition, and treatment plan as can be expected at this point. The vital signs have been stable. The patient´s condition is stable and appropriate for discharge from the emergency department.      The patient will pursue further outpatient evaluation with the primary care physician or other designated or consulting physician as outlined in the discharge instructions. They are agreeable to this plan of care and follow-up instructions have been explained in detail. The patient has received these instructions in written format and has expressed an understanding of the discharge instructions. The patient is aware that any significant change in condition or worsening of symptoms should prompt an immediate return to this or the closest emergency department or call to 911.  I have explained discharge medications and the need for follow up with the patient/caretakers. This was also printed in the discharge instructions. Patient was discharged with the following medications and follow up:      Medication List        New Prescriptions      ketorolac 10 MG tablet  Commonly known as: TORADOL  Take 1 tablet by mouth Every 6 (Six) Hours As Needed for Moderate Pain.               Where to Get Your Medications        These medications were sent to [facility]/pharmacy #[id number] - [place], [place] - [address] - [phone] [facility] [phone] FX  [address] [place] [address]      Hours: 24-hours Phone: [phone]   ketorolac 10 MG tablet      Emanuel Cordoba MD  [address] [facility] [address] [phone]    In 2 days         Final diagnoses:   Nonintractable headache, unspecified chronicity pattern, unspecified headache type        ED  Disposition       ED Disposition   Discharge    Condition   Stable    Comment   --               This medical record created using voice recognition software.             Rosy Romero MD  07/08/24 0037

## 2024-07-08 NOTE — ED TRIAGE NOTES
Pt to ed from home with c/o headache and neck pain. Pt woke up today at 0900 with pain. No medication has helped. No other s/s at this time. Pounding pain.

## 2024-07-22 DIAGNOSIS — Z30.09 BIRTH CONTROL COUNSELING: ICD-10-CM

## 2024-07-22 RX ORDER — ACETAMINOPHEN AND CODEINE PHOSPHATE 120; 12 MG/5ML; MG/5ML
1 SOLUTION ORAL DAILY
Qty: 84 TABLET | Refills: 0 | Status: SHIPPED | OUTPATIENT
Start: 2024-07-22

## 2024-07-26 NOTE — PROGRESS NOTES
"Chief Complaint   Patient presents with    Annual Exam       HPI:   34 y.o. . Presents for well woman exam. Contraception:  Birth control pill  Menses:   q 28 days, lasts 3 days, changes super tampon q 2 hrs on heaviest days.   Pain:  None  Last pap: normal SCANNED - PAP SMEAR (2021)  Complaints: none      Past Medical History:   Diagnosis Date    Gestational hypertension     History of retained placenta 10/26/2021    IBS (irritable bowel syndrome)     Ulcer, gastric, acute     Urinary tract infection       Past Surgical History:   Procedure Laterality Date    DILATATION AND CURETTAGE  2018    WISDOM TOOTH EXTRACTION        Family History   Problem Relation Age of Onset    Colon cancer Maternal Grandmother 70    Heart disease Maternal Grandfather     Hyperlipidemia Maternal Grandfather     Hypertension Maternal Grandfather     Moyamoya disease Cousin     Breast cancer Neg Hx     Ovarian cancer Neg Hx     Uterine cancer Neg Hx     Malig Hyperthermia Neg Hx     Cervical cancer Neg Hx     Stomach cancer Neg Hx     Skin cancer Neg Hx      Allergies as of 2024 - Reviewed 2024   Allergen Reaction Noted    Sulfa antibiotics Rash 10/26/2021    Sulfamethoxazole-trimethoprim Rash 2021        PCP: does manage PMHx and preventative labs    /84   Pulse 70   Ht 170.2 cm (67\")   Wt 81.2 kg (179 lb)   LMP 2024 (Exact Date)   BMI 28.04 kg/m²     PHYSICAL EXAM: Chaperone present   General- NAD, alert and oriented, appropriate  Psych- Normal mood, good memory  Neck- No masses, no thyroid enlargement  Lymphatic- No palpable neck, axillary, or groin nodes  CV- Regular rhythm, no murmurs  Resp- CTA to bases, no wheezes  Abdomen- Soft, non distended, non tender, no masses  Breast left-  Bilaterally symmetrical, no masses, non tender, no nipple discharge  Breast right- Bilaterally symmetrical, no masses, non tender, no nipple discharge  External genitalia- Normal female, no " lesions  Urethra/meatus- Normal, no masses, non tender, no prolapse  Bladder- Normal, no masses, non tender, no prolapse  Vagina- Normal, no atrophy, no lesions, no discharge, no prolapse  Cvx- Normal, no lesions, no discharge, No cervical motion tenderness  Uterus- Normal size, shape & consistency.  Non tender, mobile.  Adnexa- No mass, non tender  Anus/Rectum/Perineum- Not performed  Ext- No edema, no cyanosis    Skin- No lesions, no rashes, no acanthosis nigricans       ASSESSMENT and PLAN:    Diagnoses and all orders for this visit:    1. Well woman exam (Primary)  -     IgP, Aptima HPV    2. Encounter for surveillance of contraceptive pills  -     norethindrone (MICRONOR) 0.35 MG tablet; Take 1 tablet by mouth Daily for 90 days.  Dispense: 90 tablet; Refill: 3      Preventative:   BREAST HEALTH- Monthly self breast exam importance and how to reviewed. MMG and/or MRI (prn) reviewed per society guidelines and her individual history. Screening Imaging: Not medically needed  CERVICAL CANCER Screening- Reviewed current ASCCP guidelines for screening w and wo cotest HPV, age specific.  Screen: Updated today  COLON CANCER Screening- Reviewed current medical society guidelines and options.  Screen:  Not medically needed  SEXUAL HEALTH: Declines STD screening  BONE HEALTH- Reviewed current medical society guidelines and options for testing, calcium and vit D intake.  Weight bearing exercise.  DEXA: Not medically needed  VACCINATIONS Recommended: COVID and booster PRN, Flu annually  Importance discussed, risk being unvaccinated reviewed.  Questions answered  Genetic testing: Does not qualify.  Smoking status- NON SMOKER  Follow up PCP/Specialist PMHx and Labs  TRACK MENSES, RTO if <q21d, >7d long, heavy or painful.    All BIRTH CONTROL options R/B/A/SE/E of each reviewed in detail.  SAFE SEX/condoms importance reviewed.      Follow Up:  Return in about 1 year (around 8/5/2025).        Lisa Leon,  APRN  08/05/2024

## 2024-08-05 ENCOUNTER — OFFICE VISIT (OUTPATIENT)
Dept: OBSTETRICS AND GYNECOLOGY | Facility: CLINIC | Age: 34
End: 2024-08-05
Payer: COMMERCIAL

## 2024-08-05 VITALS
SYSTOLIC BLOOD PRESSURE: 117 MMHG | BODY MASS INDEX: 28.09 KG/M2 | HEART RATE: 70 BPM | DIASTOLIC BLOOD PRESSURE: 84 MMHG | WEIGHT: 179 LBS | HEIGHT: 67 IN

## 2024-08-05 DIAGNOSIS — Z01.419 WELL WOMAN EXAM: Primary | ICD-10-CM

## 2024-08-05 DIAGNOSIS — Z30.41 ENCOUNTER FOR SURVEILLANCE OF CONTRACEPTIVE PILLS: ICD-10-CM

## 2024-08-05 PROCEDURE — G0123 SCREEN CERV/VAG THIN LAYER: HCPCS | Performed by: NURSE PRACTITIONER

## 2024-08-05 PROCEDURE — 87624 HPV HI-RISK TYP POOLED RSLT: CPT | Performed by: NURSE PRACTITIONER

## 2024-08-05 PROCEDURE — 99395 PREV VISIT EST AGE 18-39: CPT | Performed by: NURSE PRACTITIONER

## 2024-08-05 PROCEDURE — 99459 PELVIC EXAMINATION: CPT | Performed by: NURSE PRACTITIONER

## 2024-08-05 RX ORDER — ACETAMINOPHEN AND CODEINE PHOSPHATE 120; 12 MG/5ML; MG/5ML
1 SOLUTION ORAL DAILY
Qty: 90 TABLET | Refills: 3 | Status: SHIPPED | OUTPATIENT
Start: 2024-08-05 | End: 2024-11-03

## 2024-08-12 LAB
CYTOLOGIST CVX/VAG CYTO: NORMAL
CYTOLOGY CVX/VAG DOC CYTO: NORMAL
CYTOLOGY CVX/VAG DOC THIN PREP: NORMAL
DX ICD CODE: NORMAL
HPV I/H RISK 4 DNA CVX QL PROBE+SIG AMP: NEGATIVE
Lab: NORMAL
Lab: NORMAL
OTHER STN SPEC: NORMAL
STAT OF ADQ CVX/VAG CYTO-IMP: NORMAL

## 2024-12-02 ENCOUNTER — TELEPHONE (OUTPATIENT)
Dept: OBSTETRICS AND GYNECOLOGY | Facility: CLINIC | Age: 34
End: 2024-12-02
Payer: COMMERCIAL

## 2024-12-02 DIAGNOSIS — R11.2 NAUSEA AND VOMITING, UNSPECIFIED VOMITING TYPE: Primary | ICD-10-CM

## 2024-12-02 NOTE — TELEPHONE ENCOUNTER
Provider: DR MARY ANDRADE    Caller: Deborah WILLSON    Relationship to Patient: Self    Pharmacy: [facility]/pharmacy #[id number] - [place], [place] - [address]  [phone] [facility] [phone]      Phone Number: [phone] (home)       Reason for Call: NEW OB/NAUSEA    When was the patient last seen: 08/05/24    When did it start: LAST WEEK OR SO    Characteristics of symptom/severity: MILD TO MODERATE    Timing- Is it constant or intermittent: INTERMITTENT      What therapies/medications have you tried: NOTHING.  NEW OB LMP 10/28/24

## 2024-12-03 RX ORDER — DIPHENHYDRAMINE HYDROCHLORIDE 25 MG/1
CAPSULE ORAL
Qty: 90 TABLET | Refills: 4 | Status: SHIPPED | OUTPATIENT
Start: 2024-12-03

## 2024-12-03 NOTE — TELEPHONE ENCOUNTER
Rx for Pyridoxine & Doxylamine per protocol.  Next appointment 1/22/25. Patient informed Rx @ pharmacy

## 2024-12-12 ENCOUNTER — TELEPHONE (OUTPATIENT)
Dept: OBSTETRICS AND GYNECOLOGY | Facility: CLINIC | Age: 34
End: 2024-12-12
Payer: COMMERCIAL

## 2024-12-12 DIAGNOSIS — Z34.91 PREGNANCY WITH UNCERTAIN DATES IN FIRST TRIMESTER: Primary | ICD-10-CM

## 2024-12-12 RX ORDER — ONDANSETRON 4 MG/1
4 TABLET, FILM COATED ORAL EVERY 8 HOURS PRN
Qty: 30 TABLET | Refills: 1 | Status: SHIPPED | OUTPATIENT
Start: 2024-12-12 | End: 2025-12-12

## 2024-12-12 NOTE — TELEPHONE ENCOUNTER
Patient states she has tried the Diclegis alternative and it is not helping. She is requesting alternative treatment.   She has been scheduled for her Initial Prenatal with you 1/22/25. Her LMP was 10/28/24. Would you like her to have and testing before her appointment?     Please advise.

## 2025-01-22 ENCOUNTER — INITIAL PRENATAL (OUTPATIENT)
Dept: OBSTETRICS AND GYNECOLOGY | Facility: CLINIC | Age: 35
End: 2025-01-22
Payer: COMMERCIAL

## 2025-01-22 VITALS — BODY MASS INDEX: 28.82 KG/M2 | SYSTOLIC BLOOD PRESSURE: 126 MMHG | WEIGHT: 184 LBS | DIASTOLIC BLOOD PRESSURE: 85 MMHG

## 2025-01-22 DIAGNOSIS — Z87.59 HISTORY OF RETAINED PLACENTA: ICD-10-CM

## 2025-01-22 DIAGNOSIS — Z87.59 HISTORY OF GESTATIONAL HYPERTENSION: ICD-10-CM

## 2025-01-22 DIAGNOSIS — Z34.90 PREGNANCY, UNSPECIFIED GESTATIONAL AGE: ICD-10-CM

## 2025-01-22 DIAGNOSIS — Z34.80 SUPERVISION OF OTHER NORMAL PREGNANCY, ANTEPARTUM: Primary | ICD-10-CM

## 2025-01-22 DIAGNOSIS — O21.9 NAUSEA AND VOMITING OF PREGNANCY, ANTEPARTUM: ICD-10-CM

## 2025-01-22 LAB
ABO GROUP BLD: NORMAL
AMPHET+METHAMPHET UR QL: NEGATIVE
AMPHETAMINES UR QL: NEGATIVE
BARBITURATES UR QL SCN: NEGATIVE
BASOPHILS # BLD AUTO: 0.02 10*3/MM3 (ref 0–0.2)
BASOPHILS NFR BLD AUTO: 0.2 % (ref 0–1.5)
BENZODIAZ UR QL SCN: NEGATIVE
BLD GP AB SCN SERPL QL: NEGATIVE
BUPRENORPHINE SERPL-MCNC: NEGATIVE NG/ML
C TRACH RRNA CVX QL NAA+PROBE: NOT DETECTED
CANNABINOIDS SERPL QL: NEGATIVE
COCAINE UR QL: NEGATIVE
DEPRECATED RDW RBC AUTO: 40.5 FL (ref 37–54)
EOSINOPHIL # BLD AUTO: 0.06 10*3/MM3 (ref 0–0.4)
EOSINOPHIL NFR BLD AUTO: 0.5 % (ref 0.3–6.2)
ERYTHROCYTE [DISTWIDTH] IN BLOOD BY AUTOMATED COUNT: 12.6 % (ref 12.3–15.4)
FENTANYL UR-MCNC: NEGATIVE NG/ML
GLUCOSE UR STRIP-MCNC: NEGATIVE MG/DL
HBV SURFACE AG SERPL QL IA: NORMAL
HCT VFR BLD AUTO: 37.8 % (ref 34–46.6)
HCV AB SER QL: NORMAL
HGB BLD-MCNC: 13 G/DL (ref 12–15.9)
HIV 1+2 AB+HIV1 P24 AG SERPL QL IA: NORMAL
IMM GRANULOCYTES # BLD AUTO: 0.04 10*3/MM3 (ref 0–0.05)
IMM GRANULOCYTES NFR BLD AUTO: 0.4 % (ref 0–0.5)
LYMPHOCYTES # BLD AUTO: 0.78 10*3/MM3 (ref 0.7–3.1)
LYMPHOCYTES NFR BLD AUTO: 7 % (ref 19.6–45.3)
MCH RBC QN AUTO: 30.6 PG (ref 26.6–33)
MCHC RBC AUTO-ENTMCNC: 34.4 G/DL (ref 31.5–35.7)
MCV RBC AUTO: 88.9 FL (ref 79–97)
METHADONE UR QL SCN: NEGATIVE
MONOCYTES # BLD AUTO: 0.47 10*3/MM3 (ref 0.1–0.9)
MONOCYTES NFR BLD AUTO: 4.2 % (ref 5–12)
N GONORRHOEA RRNA SPEC QL NAA+PROBE: NOT DETECTED
NEUTROPHILS NFR BLD AUTO: 87.7 % (ref 42.7–76)
NEUTROPHILS NFR BLD AUTO: 9.79 10*3/MM3 (ref 1.7–7)
NRBC BLD AUTO-RTO: 0 /100 WBC (ref 0–0.2)
OPIATES UR QL: NEGATIVE
OXYCODONE UR QL SCN: NEGATIVE
PCP UR QL SCN: NEGATIVE
PLATELET # BLD AUTO: 301 10*3/MM3 (ref 140–450)
PMV BLD AUTO: 11.1 FL (ref 6–12)
PROT UR STRIP-MCNC: NEGATIVE MG/DL
RBC # BLD AUTO: 4.25 10*6/MM3 (ref 3.77–5.28)
RH BLD: POSITIVE
TRICYCLICS UR QL SCN: NEGATIVE
WBC NRBC COR # BLD AUTO: 11.16 10*3/MM3 (ref 3.4–10.8)

## 2025-01-22 PROCEDURE — 87086 URINE CULTURE/COLONY COUNT: CPT | Performed by: OBSTETRICS & GYNECOLOGY

## 2025-01-22 PROCEDURE — 87591 N.GONORRHOEAE DNA AMP PROB: CPT | Performed by: OBSTETRICS & GYNECOLOGY

## 2025-01-22 PROCEDURE — 87491 CHLMYD TRACH DNA AMP PROBE: CPT | Performed by: OBSTETRICS & GYNECOLOGY

## 2025-01-22 PROCEDURE — 86803 HEPATITIS C AB TEST: CPT | Performed by: OBSTETRICS & GYNECOLOGY

## 2025-01-22 PROCEDURE — 80307 DRUG TEST PRSMV CHEM ANLYZR: CPT | Performed by: OBSTETRICS & GYNECOLOGY

## 2025-01-22 PROCEDURE — 80081 OBSTETRIC PANEL INC HIV TSTG: CPT | Performed by: OBSTETRICS & GYNECOLOGY

## 2025-01-22 RX ORDER — ASPIRIN 81 MG/1
81 TABLET ORAL DAILY
Qty: 30 TABLET | Refills: 8 | Status: SHIPPED | OUTPATIENT
Start: 2025-01-22

## 2025-01-23 PROBLEM — O21.9 NAUSEA AND VOMITING OF PREGNANCY, ANTEPARTUM: Status: ACTIVE | Noted: 2025-01-23

## 2025-01-23 LAB — RPR SER QL: NORMAL

## 2025-01-23 NOTE — ASSESSMENT & PLAN NOTE
We discussed nausea vomiting pregnancy in great detail.  We discussed that a majority of patients will begin to improve with her nausea and vomiting over the next few weeks.  We did discuss that some patients continue to have significant nausea vomiting throughout the pregnancy.  I recommend the patient continue daily vitamin B6 and doxylamine.  I also recommend the patient continue Zofran 4 mg every 8 hours as needed for nausea and vomiting.

## 2025-01-23 NOTE — PROGRESS NOTES
[facility]  OB Initial Visit    CC: Here for initial care of pregnancy     Subjective:  34 y.o.  presenting for her first obstetrical visit.  The patient denies any vaginal bleeding or pelvic pain.  She does report continued nausea and vomiting.  She is currently taking vitamin B6 and Unisom as well as Zofran as needed.  She reports that these medicines do help but she still has some nausea and vomiting.    LMP: Patient's last menstrual period was 10/28/2024.     History:   Last Pap:   Last Completed Pap Smear            PAP SMEAR (Every 3 Years) Next due on 2024  IgP, Aptima HPV    2021  SCANNED - PAP SMEAR                  Past medical and surgical history, medications, allergies, social history, and OB/GYN history reviewed and updated. Preventative care history, history of abuse/safe environment, vaccine history,  genetic history reviewed and updated    OB History    Para Term  AB Living   4 3 2 1 0 3   SAB IAB Ectopic Molar Multiple Live Births   0 0 0 0 0 3      # Outcome Date GA Lbr Nick/2nd Weight Sex Type Anes PTL Lv   4 Current            3 Term 22 37w5d / 168:18 3710 g (8 lb 2.9 oz) F Vag-Spont EPI N JACQUIE   2 Term 10/12/18 38w0d   M Vaginal unsp   JACQUIE   1  07/02/15 34w0d   F Vaginal unsp   JACQUIE     Objective:  /85   Wt 83.5 kg (184 lb)   LMP 10/28/2024   BMI 28.82 kg/m²     Physical Exam  Vitals and nursing note reviewed. Exam conducted with a chaperone present.   Constitutional:       General: She is not in acute distress.     Appearance: Normal appearance. She is not ill-appearing.   HENT:      Head: Normocephalic and atraumatic.      Mouth/Throat:      Mouth: Mucous membranes are moist.      Pharynx: Oropharynx is clear.   Eyes:      Extraocular Movements: Extraocular movements intact.   Cardiovascular:      Rate and Rhythm: Normal rate and regular rhythm.   Pulmonary:      Effort: Pulmonary effort is normal. No  respiratory distress.      Breath sounds: Normal breath sounds. No wheezing.   Abdominal:      General: Abdomen is flat. There is no distension.      Palpations: Abdomen is soft. There is no mass.      Tenderness: There is no abdominal tenderness. There is no guarding or rebound.      Hernia: No hernia is present. There is no hernia in the left inguinal area or right inguinal area.   Genitourinary:     General: Normal vulva.      Exam position: Lithotomy position.      Pubic Area: No rash.       Labia:         Right: No rash, tenderness, lesion or injury.         Left: No rash, tenderness, lesion or injury.       Urethra: No prolapse, urethral pain or urethral lesion.      Vagina: No signs of injury and foreign body. No vaginal discharge, erythema, tenderness or bleeding.      Cervix: No cervical motion tenderness, discharge, friability, lesion, erythema or cervical bleeding.      Uterus: Enlarged. Not fixed, not tender and no uterine prolapse.       Adnexa:         Right: No mass or tenderness.          Left: No mass or tenderness.        Comments: The uterus is gravid and approximately 13 weeks in size  Musculoskeletal:         General: No swelling.      Right lower leg: No edema.      Left lower leg: No edema.   Skin:     General: Skin is warm and dry.      Findings: No rash.   Neurological:      Mental Status: She is alert and oriented to person, place, and time.   Psychiatric:         Mood and Affect: Mood normal.         Behavior: Behavior normal.         Thought Content: Thought content normal.         Judgment: Judgment normal.       Assessment and Plan:  Diagnoses and all orders for this visit:    1. Supervision of other normal pregnancy, antepartum (Primary)  Overview:  EDC: 7/27/2025    Prenatal genetic screening: Declined    COVID-19 vaccine: Recommended  Flu vaccine: Done  Tdap vaccine: Recommended    Assessment & Plan:  Continue prenatal vitamins  Check prenatal labs  Today's dating ultrasound was  reviewed, and her EDC was finalized.  Discussed office visit schedule and call rotation  Reviewed medication safe in pregnancy  Declines prenatal genetic screening  Reviewed nutrition, exercise, and appropriate weight gain in pregnancy    Orders:  -     Chlamydia trachomatis, Neisseria gonorrhoeae, PCR - Swab, Cervix  -     OB Panel With HIV  -     Urine Culture - Urine, Urine, Clean Catch  -     Urine Drug Screen - Urine, Clean Catch  -     US Ob Detail Fetal Anatomy Single or First Gestation; Future  -     Fentanyl, Urine - Urine, Clean Catch    2. Pregnancy, unspecified gestational age  -     POC Urinalysis Dipstick    3. History of gestational hypertension  Assessment & Plan:  We discussed gestational hypertension and recurrence risks in great detail.  I recommend the patient take aspirin 81 mg daily for preeclampsia mitigation.    Orders:  -     aspirin 81 MG EC tablet; Take 1 tablet by mouth Daily.  Dispense: 30 tablet; Refill: 8    4. History of retained placenta  Assessment & Plan:  Discussed risks of recurrent retained placenta.      5. Nausea and vomiting of pregnancy, antepartum  Assessment & Plan:  We discussed nausea vomiting pregnancy in great detail.  We discussed that a majority of patients will begin to improve with her nausea and vomiting over the next few weeks.  We did discuss that some patients continue to have significant nausea vomiting throughout the pregnancy.  I recommend the patient continue daily vitamin B6 and doxylamine.  I also recommend the patient continue Zofran 4 mg every 8 hours as needed for nausea and vomiting.        13w3d    Genetic Screening: Counseled.  Declines all.     Vaccines: s/p FLU vaccine this season  Recommend COVID vaccine, R/B discussed    Counseling: Nutrition discussed, calories, activity/exercise in pregnancy  Discussed dietary restrictions/safety food preparation in pregnancy  Reviewed what to expect prenatal visits, office providers (female and male) and  covering [facility] Hospitalists  Appropriate trimester precautions provided, N/V, vag bleeding, cramping  VACCINE importance in pregnancy discussed.  Maternal and fetal risk of not being vaccinated reviewed NLT increased risk maternal/fetal severity of illness/death, PTD, CS, hemorrhage, HTN, possible IUFD.  Significant maternal and fetal/infant benefit w vaccination.  FDA approval and ACOG/SMFM/CDC strong recommendation in pregnancy.  Questions answered.   Questions answered    Return in about 4 weeks (around 2/19/2025) for Recheck.    Guzman Engle MD  01/22/2025

## 2025-01-23 NOTE — ASSESSMENT & PLAN NOTE
Continue prenatal vitamins  Check prenatal labs  Today's dating ultrasound was reviewed, and her EDC was finalized.  Discussed office visit schedule and call rotation  Reviewed medication safe in pregnancy  Declines prenatal genetic screening  Reviewed nutrition, exercise, and appropriate weight gain in pregnancy

## 2025-01-23 NOTE — ASSESSMENT & PLAN NOTE
We discussed gestational hypertension and recurrence risks in great detail.  I recommend the patient take aspirin 81 mg daily for preeclampsia mitigation.

## 2025-01-24 LAB
BACTERIA SPEC AEROBE CULT: NO GROWTH
RUBV IGG SERPL IA-ACNC: 2.18 INDEX

## 2025-02-19 ENCOUNTER — ROUTINE PRENATAL (OUTPATIENT)
Dept: OBSTETRICS AND GYNECOLOGY | Facility: CLINIC | Age: 35
End: 2025-02-19
Payer: COMMERCIAL

## 2025-02-19 VITALS — BODY MASS INDEX: 29.13 KG/M2 | DIASTOLIC BLOOD PRESSURE: 88 MMHG | WEIGHT: 186 LBS | SYSTOLIC BLOOD PRESSURE: 124 MMHG

## 2025-02-19 DIAGNOSIS — Z34.80 SUPERVISION OF OTHER NORMAL PREGNANCY, ANTEPARTUM: Primary | ICD-10-CM

## 2025-02-19 DIAGNOSIS — Z87.59 HISTORY OF GESTATIONAL HYPERTENSION: ICD-10-CM

## 2025-02-19 LAB
GLUCOSE UR STRIP-MCNC: NEGATIVE MG/DL
PROT UR STRIP-MCNC: NEGATIVE MG/DL

## 2025-02-19 NOTE — PROGRESS NOTES
OB FOLLOW UP        Chief Complaint   Patient presents with    Routine Prenatal Visit       Subjective:   Hip pain    Objective:  /88   Wt 84.4 kg (186 lb)   LMP 10/28/2024   BMI 29.13 kg/m²   Uterine Size: size equals dates, below umbilicus  FHT: 110-160 BPM    See OB flow for LE edema, cvx exam if performed, and Upro/Uglu    Assessment and Plan:  17w3d  Reassuring pregnancy progress.  Questions answered.  Diagnoses and all orders for this visit:    1. Supervision of other normal pregnancy, antepartum (Primary)  Overview:  EDC: 7/27/2025    Prenatal genetic screening: Declined    COVID-19 vaccine: Recommended  Flu vaccine: Done  Tdap vaccine: Recommended    Assessment & Plan:  Doing well, having some hip pain  Encouraged support belt  Anatomy US next visit  Prenatal labs reviewed.     Orders:  -     POC Urinalysis Dipstick    2. History of gestational hypertension  Assessment & Plan:  Continue ASA 81 mg daily        Counseling:    Second trimester precautions  Continue PNV.  Importance of healthy eating and exercise.    Return in about 1 month (around 3/19/2025) for Dr. Engle, OB follow up, Anatomy ultrasound.          Chuck Bennett, APRN  02/19/2025    [facility] RYANNE RAINEY  [facility]  [address] [place] [address]  Dept: [phone]  Dept Fax: [phone]  Loc: [phone]

## 2025-02-19 NOTE — ASSESSMENT & PLAN NOTE
Doing well, having some hip pain  Encouraged support belt  Anatomy US next visit  Prenatal labs reviewed.

## 2025-03-19 ENCOUNTER — ROUTINE PRENATAL (OUTPATIENT)
Dept: OBSTETRICS AND GYNECOLOGY | Facility: CLINIC | Age: 35
End: 2025-03-19
Payer: COMMERCIAL

## 2025-03-19 VITALS — DIASTOLIC BLOOD PRESSURE: 81 MMHG | SYSTOLIC BLOOD PRESSURE: 122 MMHG | WEIGHT: 188 LBS | BODY MASS INDEX: 29.44 KG/M2

## 2025-03-19 DIAGNOSIS — O43.109 PLACENTAL ABNORMALITY, ANTEPARTUM: ICD-10-CM

## 2025-03-19 DIAGNOSIS — Z87.59 HISTORY OF GESTATIONAL HYPERTENSION: ICD-10-CM

## 2025-03-19 DIAGNOSIS — Z34.80 SUPERVISION OF OTHER NORMAL PREGNANCY, ANTEPARTUM: Primary | ICD-10-CM

## 2025-03-19 DIAGNOSIS — Z87.59 HISTORY OF RETAINED PLACENTA: ICD-10-CM

## 2025-03-19 PROBLEM — O21.9 NAUSEA AND VOMITING OF PREGNANCY, ANTEPARTUM: Status: RESOLVED | Noted: 2025-01-23 | Resolved: 2025-03-19

## 2025-03-19 LAB
GLUCOSE UR STRIP-MCNC: NEGATIVE MG/DL
PROT UR STRIP-MCNC: NEGATIVE MG/DL

## 2025-03-19 PROCEDURE — 0502F SUBSEQUENT PRENATAL CARE: CPT | Performed by: OBSTETRICS & GYNECOLOGY

## 2025-03-19 NOTE — ASSESSMENT & PLAN NOTE
Reviewed placental lakes.  Discussed that placental lakes have been associated with poor fetal growth in some studies.  Recommended serial growth ultrasounds in the third trimester.

## 2025-03-19 NOTE — PROGRESS NOTES
[facility]  OB Follow Up Visit    CC: Routine obstetrical visit    Prenatal care complicated by:  Patient Active Problem List   Diagnosis    Irritable bowel syndrome    Anxiety    Vitamin B12 deficiency    Vitamin D deficiency    Supervision of other normal pregnancy, antepartum    History of gestational hypertension    History of retained placenta    Placental abnormality     Subjective:   Deborah WILLSON is a 35 y.o.  21w3d patient being seen today for her obstetrical follow up visit. The patient has: No complaints, No leaking fluid, No vaginal bleeding, No contractions, Adequate FM    History: Past medical and surgical history, medications, allergies, social history, and obstetrical history all reviewed and updated.    Objective:    Urine glucose/protein - See OB flow sheet      /81   Wt 85.3 kg (188 lb)   LMP 10/28/2024   BMI 29.44 kg/m²     General exam: Comfortable, NAD  FHR: 160 BPM   Uterine Size:  21 cm  Pelvic Exam: No    Assessment and Plan:  Diagnoses and all orders for this visit:    1. Supervision of other normal pregnancy, antepartum (Primary)  Overview:  EDC: 2025    Prenatal genetic screening: Declined    COVID-19 vaccine: Recommended  Flu vaccine: Done  Tdap vaccine: Recommended    Assessment & Plan:  Today's anatomy ultrasound was reviewed.  The anatomy is normal and complete.  Some placental lakes were noted.  Plan to follow-up with growth ultrasounds  1 hour GTT next office visit  Continue prenatal vitamin    Orders:  -     POC Urinalysis Dipstick  -     US Ob 14 + Weeks Single or First Gestation; Future    2. History of gestational hypertension  Assessment & Plan:  ASA 81 mg daily       3. History of retained placenta    4. Placental abnormality, antepartum  Overview:  Placental lakes    Assessment & Plan:  Reviewed placental lakes.  Discussed that placental lakes have been associated with poor fetal growth in some studies.  Recommended serial growth  ultrasounds in the third trimester.        21w3d  Reassuring pregnancy progress    Counseling: Second trimester precautions  OB precautions, leaking, VB, yani bernal vs PTL/Labor    Questions answered    Return in about 4 weeks (around 4/16/2025) for Recheck.    Gzuman Engle MD  03/19/2025

## 2025-03-19 NOTE — ASSESSMENT & PLAN NOTE
Today's anatomy ultrasound was reviewed.  The anatomy is normal and complete.  Some placental lakes were noted.  Plan to follow-up with growth ultrasounds  1 hour GTT next office visit  Continue prenatal vitamin

## 2025-03-24 ENCOUNTER — TELEPHONE (OUTPATIENT)
Dept: OBSTETRICS AND GYNECOLOGY | Facility: CLINIC | Age: 35
End: 2025-03-24
Payer: COMMERCIAL

## 2025-03-24 NOTE — TELEPHONE ENCOUNTER
Patient called and discussed. She is aware hormonal changes can effect the amount and color of her discharge. She was given labor precautions and will monitor for concerns.

## 2025-03-24 NOTE — TELEPHONE ENCOUNTER
Provider:  DR ANDRADE    Caller: AMANDA WILLSON    Phone Number: [phone]     Reason for Call: PATIENT IS CALLING BECAUSE SHE THINKS SHE LOST HER MUCUS PLUG SHE HAD A A BIG GLOB IN HER PANTIES/ OFF WHITE AND YELLOWISH IN COLOR//PLEASE FOLLOW UP

## 2025-04-16 ENCOUNTER — ROUTINE PRENATAL (OUTPATIENT)
Dept: OBSTETRICS AND GYNECOLOGY | Age: 35
End: 2025-04-16
Payer: COMMERCIAL

## 2025-04-16 VITALS — SYSTOLIC BLOOD PRESSURE: 127 MMHG | BODY MASS INDEX: 30.07 KG/M2 | WEIGHT: 192 LBS | DIASTOLIC BLOOD PRESSURE: 82 MMHG

## 2025-04-16 DIAGNOSIS — Z87.59 HISTORY OF GESTATIONAL HYPERTENSION: ICD-10-CM

## 2025-04-16 DIAGNOSIS — Z34.80 SUPERVISION OF OTHER NORMAL PREGNANCY, ANTEPARTUM: Primary | ICD-10-CM

## 2025-04-16 DIAGNOSIS — O43.109 PLACENTAL ABNORMALITY, ANTEPARTUM: ICD-10-CM

## 2025-04-16 DIAGNOSIS — Z87.59 HISTORY OF RETAINED PLACENTA: ICD-10-CM

## 2025-04-16 LAB
DEPRECATED RDW RBC AUTO: 37.3 FL (ref 37–54)
ERYTHROCYTE [DISTWIDTH] IN BLOOD BY AUTOMATED COUNT: 11.9 % (ref 12.3–15.4)
GLUCOSE 1H P GLC SERPL-MCNC: 154 MG/DL (ref 65–139)
GLUCOSE UR STRIP-MCNC: NEGATIVE MG/DL
HCT VFR BLD AUTO: 31.3 % (ref 34–46.6)
HGB BLD-MCNC: 10.3 G/DL (ref 12–15.9)
MCH RBC QN AUTO: 29.3 PG (ref 26.6–33)
MCHC RBC AUTO-ENTMCNC: 32.9 G/DL (ref 31.5–35.7)
MCV RBC AUTO: 88.9 FL (ref 79–97)
PLATELET # BLD AUTO: 267 10*3/MM3 (ref 140–450)
PMV BLD AUTO: 10.8 FL (ref 6–12)
PROT UR STRIP-MCNC: NEGATIVE MG/DL
RBC # BLD AUTO: 3.52 10*6/MM3 (ref 3.77–5.28)
WBC NRBC COR # BLD AUTO: 9.39 10*3/MM3 (ref 3.4–10.8)

## 2025-04-16 PROCEDURE — 85027 COMPLETE CBC AUTOMATED: CPT | Performed by: OBSTETRICS & GYNECOLOGY

## 2025-04-16 PROCEDURE — 82950 GLUCOSE TEST: CPT | Performed by: OBSTETRICS & GYNECOLOGY

## 2025-04-16 NOTE — PROGRESS NOTES
[facility]  OB Follow Up Visit    CC: Routine obstetrical visit    Prenatal care complicated by:  Patient Active Problem List   Diagnosis    Irritable bowel syndrome    Anxiety    Vitamin B12 deficiency    Vitamin D deficiency    Supervision of other normal pregnancy, antepartum    History of gestational hypertension    History of retained placenta    Placental abnormality     Subjective:   Deborah WILLSON is a 35 y.o.  25w3d patient being seen today for her obstetrical follow up visit. The patient has: No complaints, No leaking fluid, No vaginal bleeding, No contractions, Adequate FM    History: Past medical and surgical history, medications, allergies, social history, and obstetrical history all reviewed and updated.    Objective:    Urine glucose/protein - See OB flow sheet      /82   Wt 87.1 kg (192 lb)   LMP 10/28/2024   BMI 30.07 kg/m²     General exam: Comfortable, NAD  FHR: 149 BPM   Uterine Size:  25 cm  Pelvic Exam: No    Assessment and Plan:  Diagnoses and all orders for this visit:    1. Supervision of other normal pregnancy, antepartum (Primary)  Overview:  EDC: 2025    Prenatal genetic screening: Declined    COVID-19 vaccine: Recommended  Flu vaccine: Done  Tdap vaccine: Recommended    Assessment & Plan:  1 hour GTT today  Continue prenatal vitamins  Fetal kick counts   labor warnings    Orders:  -     POC Urinalysis Dipstick  -     CBC (No Diff); Future  -     Gestational Diabetes Screen 1 Hour; Future    2. Placental abnormality, antepartum  Overview:  Placental lakes    Assessment & Plan:  Growth ultra next office visit      3. History of gestational hypertension  Assessment & Plan:  BP room is normal.  Continue ASA 81 mg daily      4. History of retained placenta      25w3d  Reassuring pregnancy progress    Counseling: OB precautions, leaking, VB, yani bernal vs PTL/Labor  [facility]    Questions answered    Return in about 4 weeks (around 2025) for  Shelli.    Guzman Engle MD  04/16/2025

## 2025-04-17 ENCOUNTER — RESULTS FOLLOW-UP (OUTPATIENT)
Dept: OBSTETRICS AND GYNECOLOGY | Age: 35
End: 2025-04-17
Payer: COMMERCIAL

## 2025-04-17 DIAGNOSIS — Z34.80 SUPERVISION OF OTHER NORMAL PREGNANCY, ANTEPARTUM: Primary | ICD-10-CM

## 2025-04-17 RX ORDER — FERROUS SULFATE 325(65) MG
325 TABLET ORAL EVERY OTHER DAY
Qty: 30 TABLET | Refills: 3 | Status: SHIPPED | OUTPATIENT
Start: 2025-04-17

## 2025-04-25 NOTE — TELEPHONE ENCOUNTER
3RD attempted mailing letter to patient to have her call in office to get her to schedule her 3hr GTT.

## 2025-04-28 NOTE — TELEPHONE ENCOUNTER
Patient called back in she is going to go to the [facility] to get the 3hr GTT done. Orders have been sent over

## 2025-05-07 LAB
GLUCOSE 1H P 100 G GLC PO SERPL-MCNC: 172 MG/DL
GLUCOSE 2H P 100 G GLC PO SERPL-MCNC: 154 MG/DL
GLUCOSE 3H P 100 G GLC PO SERPL-MCNC: 121 MG/DL
GLUCOSE P FAST SERPL-MCNC: 78 MG/DL

## 2025-05-14 ENCOUNTER — ROUTINE PRENATAL (OUTPATIENT)
Dept: OBSTETRICS AND GYNECOLOGY | Age: 35
End: 2025-05-14
Payer: COMMERCIAL

## 2025-05-14 VITALS — DIASTOLIC BLOOD PRESSURE: 84 MMHG | SYSTOLIC BLOOD PRESSURE: 124 MMHG | BODY MASS INDEX: 30.38 KG/M2 | WEIGHT: 194 LBS

## 2025-05-14 DIAGNOSIS — O43.109 PLACENTAL ABNORMALITY, ANTEPARTUM: ICD-10-CM

## 2025-05-14 DIAGNOSIS — Z87.59 HISTORY OF RETAINED PLACENTA: ICD-10-CM

## 2025-05-14 DIAGNOSIS — Z23 NEED FOR TDAP VACCINATION: ICD-10-CM

## 2025-05-14 DIAGNOSIS — Z87.59 HISTORY OF GESTATIONAL HYPERTENSION: ICD-10-CM

## 2025-05-14 DIAGNOSIS — Z34.80 SUPERVISION OF OTHER NORMAL PREGNANCY, ANTEPARTUM: Primary | ICD-10-CM

## 2025-05-14 LAB
GLUCOSE UR STRIP-MCNC: NEGATIVE MG/DL
PROT UR STRIP-MCNC: NEGATIVE MG/DL

## 2025-05-14 NOTE — PROGRESS NOTES
[facility]  OB Follow Up Visit    CC: Routine obstetrical visit    Prenatal care complicated by:  Patient Active Problem List   Diagnosis    Irritable bowel syndrome    Anxiety    Vitamin B12 deficiency    Vitamin D deficiency    Supervision of other normal pregnancy, antepartum    History of gestational hypertension    History of retained placenta    Placental abnormality     Subjective:   Deborah WILLSON is a 35 y.o.  29w3d patient being seen today for her obstetrical follow up visit. The patient has: No complaints, No leaking fluid, No vaginal bleeding, No contractions, Adequate FM    History: Past medical and surgical history, medications, allergies, social history, and obstetrical history all reviewed and updated.    Objective:    Urine glucose/protein - See OB flow sheet      /84   Wt 88 kg (194 lb)   LMP 10/28/2024   BMI 30.38 kg/m²     General exam: Comfortable, NAD  FHR: 144 BPM   Uterine Size:  30 cm  Pelvic Exam: No    Assessment and Plan:  Diagnoses and all orders for this visit:    1. Supervision of other normal pregnancy, antepartum (Primary)  Overview:  EDC: 2025    Prenatal genetic screening: Declined    COVID-19 vaccine: Recommended  Flu vaccine: Done  Tdap vaccine: Done on 2025    Assessment & Plan:  Elevated 1 hour GTT, but normal 3-hour GTT  Continue prenatal vitamins  Fetal kick counts   labor warnings  Tdap done today    Orders:  -     POC Urinalysis Dipstick    2. History of gestational hypertension  Assessment & Plan:  BP room is normal.  Continue ASA 81 mg daily for preeclampsia mitigation      3. Placental abnormality, antepartum  Overview:  Placental lakes    Assessment & Plan:  Today's ultrasound reviewed.  The fetal growth is appropriate.  TONYA is 20.  Placental lakes are once again noted.  Continue serial growth ultrasounds    Orders:  -     US Ob 14 + Weeks Single or First Gestation; Future    4. History of retained placenta    5.  Need for Tdap vaccination  -     Tdap Vaccine => 8yo IM (BOOSTRIX/ADACEL)      29w3d  Reassuring pregnancy progress    Counseling: OB precautions, leaking, VB, yani bernal vs PTL/Labor  [facility]    Questions answered    Return in about 2 weeks (around 5/28/2025) for Recheck.    Guzman Engle MD  05/14/2025

## 2025-05-16 NOTE — ASSESSMENT & PLAN NOTE
Today's ultrasound reviewed.  The fetal growth is appropriate.  TONYA is 20.  Placental lakes are once again noted.  Continue serial growth ultrasounds

## 2025-05-16 NOTE — ASSESSMENT & PLAN NOTE
Elevated 1 hour GTT, but normal 3-hour GTT  Continue prenatal vitamins  Fetal kick counts   labor warnings  Tdap done today

## 2025-05-30 ENCOUNTER — ROUTINE PRENATAL (OUTPATIENT)
Dept: OBSTETRICS AND GYNECOLOGY | Age: 35
End: 2025-05-30
Payer: COMMERCIAL

## 2025-05-30 VITALS — DIASTOLIC BLOOD PRESSURE: 82 MMHG | BODY MASS INDEX: 30.7 KG/M2 | WEIGHT: 196 LBS | SYSTOLIC BLOOD PRESSURE: 127 MMHG

## 2025-05-30 DIAGNOSIS — Z34.80 SUPERVISION OF OTHER NORMAL PREGNANCY, ANTEPARTUM: Primary | ICD-10-CM

## 2025-05-30 LAB
GLUCOSE UR STRIP-MCNC: NEGATIVE MG/DL
PROT UR STRIP-MCNC: NEGATIVE MG/DL

## 2025-05-30 NOTE — PROGRESS NOTES
OB FOLLOW UP  CC- Here for care of pregnancy        Deborah WILLSON is a 35 y.o.  31w5d patient being seen today for her obstetrical follow up visit. Patient reports no complaints.     Her prenatal care is complicated by (and status) :    Patient Active Problem List   Diagnosis    Irritable bowel syndrome    Anxiety    Vitamin B12 deficiency    Vitamin D deficiency    Supervision of other normal pregnancy, antepartum    History of gestational hypertension    History of retained placenta    Placental abnormality           Flu Status:   Covid Status:    ROS -   Patient Reports : No Problems  Patient Denies: Loss of Fluid and Vaginal Spotting  Fetal Movement : normal  All other systems reviewed and are negative.       The additional following portions of the patient's history were reviewed and updated as appropriate: allergies, current medications, past family history, past medical history, past social history, past surgical history, and problem list.    I have reviewed and agree with the HPI, ROS, and historical information as entered above. Jackelyn Hopson DO    /82   Wt 88.9 kg (196 lb)   LMP 10/28/2024   BMI 30.70 kg/m²       EXAM:     FHT: 133 BPM   Uterine Size: 34 cm  Pelvic Exam: No    Urine glucose/protein: See prenatal flowsheet       Assessment and Plan  Diagnoses and all orders for this visit:    1. Supervision of other normal pregnancy, antepartum (Primary)  Overview:  EDC: 2025    Prenatal genetic screening: Declined    COVID-19 vaccine: Recommended  Flu vaccine: Done  Tdap vaccine: Done on 2025    Orders:  -     POC Urinalysis Dipstick         Pregnancy at 31w5d  Fetal status reassuring.   Activity and Exercise discussed.  Return in about 2 weeks (around 2025).  Kick counts bid    Jackelyn Hopson DO  2025

## 2025-06-10 ENCOUNTER — ROUTINE PRENATAL (OUTPATIENT)
Dept: OBSTETRICS AND GYNECOLOGY | Age: 35
End: 2025-06-10
Payer: COMMERCIAL

## 2025-06-10 VITALS — WEIGHT: 196 LBS | DIASTOLIC BLOOD PRESSURE: 87 MMHG | BODY MASS INDEX: 30.7 KG/M2 | SYSTOLIC BLOOD PRESSURE: 133 MMHG

## 2025-06-10 DIAGNOSIS — O43.109 PLACENTAL ABNORMALITY, ANTEPARTUM: ICD-10-CM

## 2025-06-10 DIAGNOSIS — Z87.59 HISTORY OF RETAINED PLACENTA: ICD-10-CM

## 2025-06-10 DIAGNOSIS — Z34.80 SUPERVISION OF OTHER NORMAL PREGNANCY, ANTEPARTUM: Primary | ICD-10-CM

## 2025-06-10 DIAGNOSIS — Z87.59 HISTORY OF GESTATIONAL HYPERTENSION: ICD-10-CM

## 2025-06-10 LAB
GLUCOSE UR STRIP-MCNC: NEGATIVE MG/DL
PROT UR STRIP-MCNC: NEGATIVE MG/DL

## 2025-06-10 NOTE — ASSESSMENT & PLAN NOTE
Today's ultrasound was reviewed.  EFW is at the 84th percentile.  The AC is at the 89th percentile.  EFW is 5 pounds 9 ounces.  TONYA is normal.  Cephalic presentation confirmed  Continue prenatal vitamins  Fetal kick counts   labor warnings

## 2025-06-10 NOTE — PROGRESS NOTES
[facility]  OB Follow Up Visit    CC: Routine obstetrical visit    Prenatal care complicated by:  Patient Active Problem List   Diagnosis    Irritable bowel syndrome    Anxiety    Vitamin B12 deficiency    Vitamin D deficiency    Supervision of other normal pregnancy, antepartum    History of gestational hypertension    History of retained placenta    Placental abnormality     Subjective:   Deborah WILLSON is a 35 y.o.  33w2d patient being seen today for her obstetrical follow up visit. The patient has: No complaints, No leaking fluid, No vaginal bleeding, Adequate FM  Reports occasional [place] Burns contractions    History: Past medical and surgical history, medications, allergies, social history, and obstetrical history all reviewed and updated.    Objective:    Urine glucose/protein - See OB flow sheet      /87   Wt 88.9 kg (196 lb)   LMP 10/28/2024   BMI 30.70 kg/m²     General exam: Comfortable, NAD  FHR: 146 BPM   Uterine Size: 36 cm  Pelvic Exam: No    Assessment and Plan:  Diagnoses and all orders for this visit:    1. Supervision of other normal pregnancy, antepartum (Primary)  Overview:  EDC: 2025    Prenatal genetic screening: Declined    COVID-19 vaccine: Recommended  Flu vaccine: Done  Tdap vaccine: Done on 2025    Assessment & Plan:  Today's ultrasound was reviewed.  EFW is at the 84th percentile.  The AC is at the 89th percentile.  EFW is 5 pounds 9 ounces.  TONYA is normal.  Cephalic presentation confirmed  Continue prenatal vitamins  Fetal kick counts   labor warnings    Orders:  -     POC Urinalysis Dipstick    2. Placental abnormality, antepartum  Overview:  Placental lakes    Assessment & Plan:  Robust growth noted today.    Orders:  -     US Ob < 14 Weeks Single or First Gestation; Future    3. History of retained placenta    4. History of gestational hypertension  Assessment & Plan:  BP is normal.  Continue ASA 81 mg daily for preeclampsia  mitigation        33w2d  Reassuring pregnancy progress    Counseling: OB precautions, leaking, VB, yani bernal vs PTL/Labor  [facility]    Questions answered    Return in about 2 weeks (around 6/24/2025) for Recheck.    Guzman Engle MD  06/10/2025

## 2025-06-19 ENCOUNTER — TELEPHONE (OUTPATIENT)
Dept: OBSTETRICS AND GYNECOLOGY | Age: 35
End: 2025-06-19
Payer: COMMERCIAL

## 2025-06-19 NOTE — TELEPHONE ENCOUNTER
[id number]: We need to reschedule your appointment on [id number] dr duncan. He is not here that day. You can keep the ultrasound. Please call the office to reschedule

## 2025-06-19 NOTE — TELEPHONE ENCOUNTER
Caller: Deborah WILLSON    Relationship: Self    Best call back number: [phone]    What is the best time to reach you: ANYTIME    Who are you requesting to speak with (clinical staff, provider,  specific staff member): SPECIFIC STAFF MEMBER    Do you know the name of the person who called: SAMMY    What was the call regarding: APPOINTMENT ON 07.08.25    Is it okay if the provider responds through MyChart: CALL BACK PLEASE

## 2025-06-27 ENCOUNTER — ROUTINE PRENATAL (OUTPATIENT)
Dept: OBSTETRICS AND GYNECOLOGY | Age: 35
End: 2025-06-27
Payer: COMMERCIAL

## 2025-06-27 VITALS — WEIGHT: 197 LBS | BODY MASS INDEX: 30.85 KG/M2 | DIASTOLIC BLOOD PRESSURE: 89 MMHG | SYSTOLIC BLOOD PRESSURE: 126 MMHG

## 2025-06-27 DIAGNOSIS — Z34.80 SUPERVISION OF OTHER NORMAL PREGNANCY, ANTEPARTUM: Primary | ICD-10-CM

## 2025-06-27 DIAGNOSIS — O43.109 PLACENTAL ABNORMALITY, ANTEPARTUM: ICD-10-CM

## 2025-06-27 DIAGNOSIS — Z87.59 HISTORY OF GESTATIONAL HYPERTENSION: ICD-10-CM

## 2025-06-27 DIAGNOSIS — Z87.59 HISTORY OF RETAINED PLACENTA: ICD-10-CM

## 2025-06-27 LAB
GLUCOSE UR STRIP-MCNC: NEGATIVE MG/DL
PROT UR STRIP-MCNC: NEGATIVE MG/DL

## 2025-06-27 PROCEDURE — 0502F SUBSEQUENT PRENATAL CARE: CPT | Performed by: OBSTETRICS & GYNECOLOGY

## 2025-06-27 NOTE — PROGRESS NOTES
[facility]  OB Follow Up Visit    CC: Routine obstetrical visit    Prenatal care complicated by:  Patient Active Problem List   Diagnosis    Irritable bowel syndrome    Anxiety    Vitamin B12 deficiency    Vitamin D deficiency    Supervision of other normal pregnancy, antepartum    History of gestational hypertension    History of retained placenta    Placental abnormality     Subjective:   Deborah WILLSON is a 35 y.o.  35w5d patient being seen today for her obstetrical follow up visit. The patient has: No complaints, No leaking fluid, No vaginal bleeding, Adequate FM  Reports some contractions    History: Past medical and surgical history, medications, allergies, social history, and obstetrical history all reviewed and updated.    Objective:    Urine glucose/protein - See OB flow sheet      /89   Wt 89.4 kg (197 lb)   LMP 10/28/2024   BMI 30.85 kg/m²     General exam: Comfortable, NAD  FHR: 152 BPM   Uterine Size: 37 cm  Pelvic Exam: No    Assessment and Plan:  Diagnoses and all orders for this visit:    1. Supervision of other normal pregnancy, antepartum (Primary)  Overview:  EDC: 2025    Prenatal genetic screening: Declined    COVID-19 vaccine: Recommended  Flu vaccine: Done  Tdap vaccine: Done on 2025    Assessment & Plan:  Continue prenatal vitamins  Fetal kick counts   labor warnings  GBS next office visit    Orders:  -     POC Urinalysis Dipstick    2. History of retained placenta    3. History of gestational hypertension  Assessment & Plan:  BP needs to be normal.  Continue ASA 81 mg daily for preeclampsia mitigation      4. Placental abnormality, antepartum  Overview:  Placental lakes        35w5d  Reassuring pregnancy progress    Counseling: OB precautions, leaking, VB, yani bernal vs PTL/Labor  [facility]    Questions answered    Return in about 1 week (around 2025) for Recheck.    Guzman Engle MD  2025

## 2025-07-01 ENCOUNTER — ROUTINE PRENATAL (OUTPATIENT)
Dept: OBSTETRICS AND GYNECOLOGY | Age: 35
End: 2025-07-01
Payer: COMMERCIAL

## 2025-07-01 VITALS — BODY MASS INDEX: 31.64 KG/M2 | WEIGHT: 202 LBS | DIASTOLIC BLOOD PRESSURE: 84 MMHG | SYSTOLIC BLOOD PRESSURE: 112 MMHG

## 2025-07-01 DIAGNOSIS — Z34.80 SUPERVISION OF OTHER NORMAL PREGNANCY, ANTEPARTUM: Primary | ICD-10-CM

## 2025-07-01 DIAGNOSIS — O43.109 PLACENTAL ABNORMALITY, ANTEPARTUM: ICD-10-CM

## 2025-07-01 DIAGNOSIS — Z87.59 HISTORY OF RETAINED PLACENTA: ICD-10-CM

## 2025-07-01 DIAGNOSIS — Z87.59 HISTORY OF GESTATIONAL HYPERTENSION: ICD-10-CM

## 2025-07-01 LAB
GLUCOSE UR STRIP-MCNC: NEGATIVE MG/DL
PROT UR STRIP-MCNC: NEGATIVE MG/DL

## 2025-07-01 PROCEDURE — 87653 STREP B DNA AMP PROBE: CPT | Performed by: OBSTETRICS & GYNECOLOGY

## 2025-07-01 NOTE — ASSESSMENT & PLAN NOTE
BP initially was slightly elevated.  No proteinuria was present.  A repeat blood pressure was normal.  I have asked the patient to keep a blood pressure log at home.  If any elevations systolic 140 or greater or diastolic 90 or greater she is to notify me.  If the patient continues to have elevated blood pressure she will likely need delivery for gestational hypertension.  Continue ASA 81 mg daily for preeclampsia mitigation.

## 2025-07-01 NOTE — PROGRESS NOTES
[facility]  OB Follow Up Visit    CC: Routine obstetrical visit    Prenatal care complicated by:  Patient Active Problem List   Diagnosis    Irritable bowel syndrome    Anxiety    Vitamin B12 deficiency    Vitamin D deficiency    Supervision of other normal pregnancy, antepartum    History of gestational hypertension    History of retained placenta    Placental abnormality     Subjective:   Deborah WILLSON is a 35 y.o.  36w2d patient being seen today for her obstetrical follow up visit. The patient has: No complaints, No leaking fluid, No vaginal bleeding, Adequate FM, No HA, No scotomata or vision changes, No RUQ/epigastric pain  Reports some contractions    History: Past medical and surgical history, medications, allergies, social history, and obstetrical history all reviewed and updated.    Objective:    Urine glucose/protein - See OB flow sheet      /84   Wt 91.6 kg (202 lb)   LMP 10/28/2024   BMI 31.64 kg/m²     General exam: Comfortable, NAD  FHR: 134 BPM   Uterine Size: 38 cm  Pelvic Exam: Yes.  Presentation: cephalic. Dilation: 3cm. Effacement: 60%. Station: -3.  A nursing chaperone was present for the patient's pelvic exam    Assessment and Plan:  Diagnoses and all orders for this visit:    1. Supervision of other normal pregnancy, antepartum (Primary)  Overview:  EDC: 2025    Prenatal genetic screening: Declined    COVID-19 vaccine: Recommended  Flu vaccine: Done  Tdap vaccine: Done on 2025    Assessment & Plan:  GBS collected  Continue prenatal vitamins  Fetal kick counts  Labor instructions    Orders:  -     POC Urinalysis Dipstick  -     Group B Strep Molecular by PCR - Swab, Vaginal/Rectum; Future  -     Group B Strep Molecular by PCR - Swab, Vaginal/Rectum    2. History of gestational hypertension  Assessment & Plan:  BP initially was slightly elevated.  No proteinuria was present.  A repeat blood pressure was normal.  I have asked the patient to keep a  blood pressure log at home.  If any elevations systolic 140 or greater or diastolic 90 or greater she is to notify me.  If the patient continues to have elevated blood pressure she will likely need delivery for gestational hypertension.  Continue ASA 81 mg daily for preeclampsia mitigation.      3. History of retained placenta    4. Placental abnormality, antepartum  Overview:  Placental lakes        36w2d  Reassuring pregnancy progress    Counseling: OB precautions, leaking, VB, yani bernal vs PTL/Labor  [facility]    Questions answered    Return in about 1 week (around 7/8/2025) for Recheck.    Guzman Engle MD  07/01/2025

## 2025-07-02 LAB — GP B STREP DNA VAG+RECTUM QL NAA+PROBE: NEGATIVE

## 2025-07-03 NOTE — PROGRESS NOTES
OB FOLLOW UP      Chief Complaint   Patient presents with    Routine Prenatal Visit     Subjective:   Deborah WILLSON is a 35 y.o.  37w2d patient being seen today for her routine obstetrical follow up visit.     New patient to me    Denies VB, leaking fluid, current regular cramping or contractions.    Bp mildly elevated today, fu wnl.  Had elevate her blood pressure on .  No HTN sxs.  Has had history of gestational hypertension in prior pregnancy.    Objective:  /87   Wt 92.5 kg (204 lb)   LMP 10/28/2024   BMI 31.95 kg/m²  11.3 kg (25 lb) Body mass index is 31.95 kg/m².  Chaperone present during pelvic exam if performed.  See OB flow sheet for fundal height (not performed if US day of OV), FHT, edema, cvx exam if performed, and Upro/Uglu.       Assessment and Plan:  37w2d     Diagnoses and all orders for this visit:    1. Gestational hypertension, third trimester (Primary)  Comments:  Meets criteria today.  Without severe features, recommend delivery today    2. Supervision of other normal pregnancy, antepartum  Overview:  EDC: 2025    Prenatal genetic screening: Declined    COVID-19 vaccine: Recommended  Flu vaccine: Done  Tdap vaccine: Done on 2025    Orders:  -     POC Urinalysis Dipstick    3. Anemia during pregnancy in third trimester  Overview:  Iron every other day-continue    Orders:  -     CBC (No Diff)    4. Placental lakes  Overview:  Placental lakes  Ultrasound 6/10/2025 Vertex, 5 pounds 11 ounces, 84%.  Placental lake noted 5.2 cm      5. Vitamin B12 deficiency  -     Vitamin B12 & Folate    6. Vitamin D deficiency  -     Vitamin D,25-Hydroxy    7. History of gestational hypertension  Overview:  ASA 81 mg continue      8. History of precipitous delivery      Counseling:    OB precautions, leaking, VB, yani bernal vs PTL/Labor  [facility]  HTN precautions reviewed: HA, vision change, RUQ/epigastric pain, edema    After patient left the office I realized she had another elevated  blood pressure earlier last week and actually meets criteria for gestational hypertension and is in need of delivery.  I contacted patient and she is in agreement.  Will send to L+D for IOL.  Dr. Hopson [facility] on call and covering OB/GYN hospitalist notified.  DW pt, questions answered.      Reassuring pregnancy progress. Continue PNV.      Return in about 1 week (around 7/15/2025) for BP check.            Layla Little, DO  07/08/2025    [facility] [address] [facility]  [address] [place] [address] [address] [place] [address]  Dept: [phone]  Dept Fax: [phone]  Loc: [phone]  Loc Fax: [phone]

## 2025-07-06 PROBLEM — F41.9 ANXIETY: Status: RESOLVED | Noted: 2022-05-19 | Resolved: 2025-07-06

## 2025-07-06 PROBLEM — K58.9 IRRITABLE BOWEL SYNDROME: Status: RESOLVED | Noted: 2022-04-05 | Resolved: 2025-07-06

## 2025-07-06 PROBLEM — O99.013 ANEMIA DURING PREGNANCY IN THIRD TRIMESTER: Status: ACTIVE | Noted: 2025-07-06

## 2025-07-08 ENCOUNTER — ANESTHESIA (OUTPATIENT)
Dept: LABOR AND DELIVERY | Facility: HOSPITAL | Age: 35
End: 2025-07-08
Payer: COMMERCIAL

## 2025-07-08 ENCOUNTER — ANESTHESIA EVENT (OUTPATIENT)
Dept: LABOR AND DELIVERY | Facility: HOSPITAL | Age: 35
End: 2025-07-08
Payer: COMMERCIAL

## 2025-07-08 ENCOUNTER — ROUTINE PRENATAL (OUTPATIENT)
Dept: OBSTETRICS AND GYNECOLOGY | Age: 35
End: 2025-07-08
Payer: COMMERCIAL

## 2025-07-08 ENCOUNTER — HOSPITAL ENCOUNTER (INPATIENT)
Facility: HOSPITAL | Age: 35
LOS: 2 days | Discharge: HOME | End: 2025-07-10
Attending: OBSTETRICS & GYNECOLOGY | Admitting: OBSTETRICS & GYNECOLOGY
Payer: COMMERCIAL

## 2025-07-08 VITALS — SYSTOLIC BLOOD PRESSURE: 122 MMHG | WEIGHT: 204 LBS | BODY MASS INDEX: 31.95 KG/M2 | DIASTOLIC BLOOD PRESSURE: 87 MMHG

## 2025-07-08 DIAGNOSIS — O43.109 PLACENTAL ABNORMALITY, ANTEPARTUM: ICD-10-CM

## 2025-07-08 DIAGNOSIS — E53.8 VITAMIN B12 DEFICIENCY: ICD-10-CM

## 2025-07-08 DIAGNOSIS — O13.3 GESTATIONAL HYPERTENSION, THIRD TRIMESTER: Primary | ICD-10-CM

## 2025-07-08 DIAGNOSIS — E55.9 VITAMIN D DEFICIENCY: ICD-10-CM

## 2025-07-08 DIAGNOSIS — O99.013 ANEMIA DURING PREGNANCY IN THIRD TRIMESTER: ICD-10-CM

## 2025-07-08 DIAGNOSIS — Z34.80 SUPERVISION OF OTHER NORMAL PREGNANCY, ANTEPARTUM: ICD-10-CM

## 2025-07-08 DIAGNOSIS — Z87.59 HISTORY OF GESTATIONAL HYPERTENSION: ICD-10-CM

## 2025-07-08 DIAGNOSIS — Z87.59 HISTORY OF PRECIPITOUS DELIVERY: ICD-10-CM

## 2025-07-08 LAB
25(OH)D3 SERPL-MCNC: 39.9 NG/ML (ref 30–100)
ABO GROUP BLD: NORMAL
ALBUMIN SERPL-MCNC: 3.7 G/DL (ref 3.5–5.2)
ALBUMIN/GLOB SERPL: 1.4 G/DL
ALP SERPL-CCNC: 131 U/L (ref 39–117)
ALT SERPL W P-5'-P-CCNC: 7 U/L (ref 1–33)
AMPHET+METHAMPHET UR QL: NEGATIVE
AMPHETAMINES UR QL: NEGATIVE
ANION GAP SERPL CALCULATED.3IONS-SCNC: 12.7 MMOL/L (ref 5–15)
AST SERPL-CCNC: 13 U/L (ref 1–32)
ATMOSPHERIC PRESS: 742.8 MMHG
BARBITURATES UR QL SCN: NEGATIVE
BASE EXCESS BLDCOA CALC-SCNC: -3.6 MMOL/L (ref -2–2)
BENZODIAZ UR QL SCN: NEGATIVE
BILIRUB SERPL-MCNC: 0.2 MG/DL (ref 0–1.2)
BLD GP AB SCN SERPL QL: NEGATIVE
BUN SERPL-MCNC: 7.8 MG/DL (ref 6–20)
BUN/CREAT SERPL: 17 (ref 7–25)
BUPRENORPHINE SERPL-MCNC: NEGATIVE NG/ML
CALCIUM SPEC-SCNC: 9.4 MG/DL (ref 8.6–10.5)
CANNABINOIDS SERPL QL: NEGATIVE
CHLORIDE SERPL-SCNC: 106 MMOL/L (ref 98–107)
CO2 SERPL-SCNC: 19.3 MMOL/L (ref 22–29)
COCAINE UR QL: NEGATIVE
CREAT SERPL-MCNC: 0.46 MG/DL (ref 0.57–1)
CREAT UR-MCNC: 192.5 MG/DL
DEPRECATED RDW RBC AUTO: 41.2 FL (ref 37–54)
DEPRECATED RDW RBC AUTO: 44.9 FL (ref 37–54)
EGFRCR SERPLBLD CKD-EPI 2021: 129.8 ML/MIN/1.73
ERYTHROCYTE [DISTWIDTH] IN BLOOD BY AUTOMATED COUNT: 13.4 % (ref 12.3–15.4)
ERYTHROCYTE [DISTWIDTH] IN BLOOD BY AUTOMATED COUNT: 14.1 % (ref 12.3–15.4)
FENTANYL UR-MCNC: NEGATIVE NG/ML
FOLATE SERPL-MCNC: 11.9 NG/ML (ref 4.78–24.2)
GLOBULIN UR ELPH-MCNC: 2.7 GM/DL
GLUCOSE SERPL-MCNC: 78 MG/DL (ref 65–99)
GLUCOSE UR STRIP-MCNC: NEGATIVE MG/DL
HCO3 BLDCOA-SCNC: 20.9 MMOL/L
HCT VFR BLD AUTO: 29.7 % (ref 34–46.6)
HCT VFR BLD AUTO: 31.7 % (ref 34–46.6)
HGB BLD-MCNC: 10.4 G/DL (ref 12–15.9)
HGB BLD-MCNC: 9.5 G/DL (ref 12–15.9)
INHALED O2 CONCENTRATION: 21 %
MCH RBC QN AUTO: 28.1 PG (ref 26.6–33)
MCH RBC QN AUTO: 28.3 PG (ref 26.6–33)
MCHC RBC AUTO-ENTMCNC: 32 G/DL (ref 31.5–35.7)
MCHC RBC AUTO-ENTMCNC: 32.8 G/DL (ref 31.5–35.7)
MCV RBC AUTO: 86.1 FL (ref 79–97)
MCV RBC AUTO: 87.9 FL (ref 79–97)
METHADONE UR QL SCN: NEGATIVE
MODALITY: ABNORMAL
OPIATES UR QL: NEGATIVE
OXYCODONE UR QL SCN: NEGATIVE
PCO2 BLDCOA: 34.8 MMHG (ref 33–49)
PCP UR QL SCN: NEGATIVE
PH BLDCOA: 7.39 PH UNITS (ref 7.18–7.34)
PLATELET # BLD AUTO: 261 10*3/MM3 (ref 140–450)
PLATELET # BLD AUTO: 270 10*3/MM3 (ref 140–450)
PMV BLD AUTO: 10.1 FL (ref 6–12)
PMV BLD AUTO: 10.7 FL (ref 6–12)
PO2 BLDCOA: 32.8 MMHG
POTASSIUM SERPL-SCNC: 3.8 MMOL/L (ref 3.5–5.2)
PROT ?TM UR-MCNC: 33.8 MG/DL
PROT SERPL-MCNC: 6.4 G/DL (ref 6–8.5)
PROT UR STRIP-MCNC: NEGATIVE MG/DL
PROT/CREAT UR: 0.18 MG/G{CREAT}
RBC # BLD AUTO: 3.38 10*6/MM3 (ref 3.77–5.28)
RBC # BLD AUTO: 3.68 10*6/MM3 (ref 3.77–5.28)
RH BLD: POSITIVE
SAO2 % BLDCOA: 62.9 %
SODIUM SERPL-SCNC: 138 MMOL/L (ref 136–145)
T&S EXPIRATION DATE: NORMAL
TREPONEMA PALLIDUM IGG+IGM AB [PRESENCE] IN SERUM OR PLASMA BY IMMUNOASSAY: NORMAL
TRICYCLICS UR QL SCN: NEGATIVE
VIT B12 BLD-MCNC: 186 PG/ML (ref 211–946)
WBC NRBC COR # BLD AUTO: 8.1 10*3/MM3 (ref 3.4–10.8)
WBC NRBC COR # BLD AUTO: 8.52 10*3/MM3 (ref 3.4–10.8)

## 2025-07-08 PROCEDURE — C1755 CATHETER, INTRASPINAL: HCPCS | Performed by: NURSE ANESTHETIST, CERTIFIED REGISTERED

## 2025-07-08 PROCEDURE — 82306 VITAMIN D 25 HYDROXY: CPT | Performed by: OBSTETRICS & GYNECOLOGY

## 2025-07-08 PROCEDURE — 85027 COMPLETE CBC AUTOMATED: CPT | Performed by: OBSTETRICS & GYNECOLOGY

## 2025-07-08 PROCEDURE — 25810000003 SODIUM CHLORIDE 0.9 % SOLUTION: Performed by: OBSTETRICS & GYNECOLOGY

## 2025-07-08 PROCEDURE — 86780 TREPONEMA PALLIDUM: CPT | Performed by: OBSTETRICS & GYNECOLOGY

## 2025-07-08 PROCEDURE — 80053 COMPREHEN METABOLIC PANEL: CPT | Performed by: OBSTETRICS & GYNECOLOGY

## 2025-07-08 PROCEDURE — 82803 BLOOD GASES ANY COMBINATION: CPT

## 2025-07-08 PROCEDURE — 25010000002 OXYTOCIN PER 10 UNITS: Performed by: OBSTETRICS & GYNECOLOGY

## 2025-07-08 PROCEDURE — 86901 BLOOD TYPING SEROLOGIC RH(D): CPT | Performed by: OBSTETRICS & GYNECOLOGY

## 2025-07-08 PROCEDURE — 82746 ASSAY OF FOLIC ACID SERUM: CPT | Performed by: OBSTETRICS & GYNECOLOGY

## 2025-07-08 PROCEDURE — 82607 VITAMIN B-12: CPT | Performed by: OBSTETRICS & GYNECOLOGY

## 2025-07-08 PROCEDURE — 25010000002 ROPIVACAINE PER 1 MG: Performed by: NURSE ANESTHETIST, CERTIFIED REGISTERED

## 2025-07-08 PROCEDURE — 86900 BLOOD TYPING SEROLOGIC ABO: CPT | Performed by: OBSTETRICS & GYNECOLOGY

## 2025-07-08 PROCEDURE — 80307 DRUG TEST PRSMV CHEM ANLYZR: CPT | Performed by: OBSTETRICS & GYNECOLOGY

## 2025-07-08 PROCEDURE — 84156 ASSAY OF PROTEIN URINE: CPT | Performed by: OBSTETRICS & GYNECOLOGY

## 2025-07-08 PROCEDURE — 86850 RBC ANTIBODY SCREEN: CPT | Performed by: OBSTETRICS & GYNECOLOGY

## 2025-07-08 PROCEDURE — 25010000002 FENTANYL CITRATE (PF) 50 MCG/ML SOLUTION: Performed by: NURSE ANESTHETIST, CERTIFIED REGISTERED

## 2025-07-08 PROCEDURE — 25810000003 LACTATED RINGERS PER 1000 ML: Performed by: OBSTETRICS & GYNECOLOGY

## 2025-07-08 PROCEDURE — 59400 OBSTETRICAL CARE: CPT | Performed by: OBSTETRICS & GYNECOLOGY

## 2025-07-08 PROCEDURE — 82570 ASSAY OF URINE CREATININE: CPT | Performed by: OBSTETRICS & GYNECOLOGY

## 2025-07-08 RX ORDER — MISOPROSTOL 200 UG/1
TABLET ORAL
Status: COMPLETED
Start: 2025-07-08 | End: 2025-07-08

## 2025-07-08 RX ORDER — SODIUM CHLORIDE 0.9 % (FLUSH) 0.9 %
10 SYRINGE (ML) INJECTION EVERY 12 HOURS SCHEDULED
Status: DISCONTINUED | OUTPATIENT
Start: 2025-07-08 | End: 2025-07-09 | Stop reason: HOSPADM

## 2025-07-08 RX ORDER — FENTANYL/ROPIVACAINE/NS/PF 2MCG/ML-.2
PLASTIC BAG, INJECTION (ML) INJECTION
Status: COMPLETED
Start: 2025-07-08 | End: 2025-07-08

## 2025-07-08 RX ORDER — SODIUM CHLORIDE 0.9 % (FLUSH) 0.9 %
10 SYRINGE (ML) INJECTION AS NEEDED
Status: DISCONTINUED | OUTPATIENT
Start: 2025-07-08 | End: 2025-07-09 | Stop reason: HOSPADM

## 2025-07-08 RX ORDER — IBUPROFEN 600 MG/1
600 TABLET, FILM COATED ORAL EVERY 6 HOURS
Status: DISCONTINUED | OUTPATIENT
Start: 2025-07-09 | End: 2025-07-09 | Stop reason: HOSPADM

## 2025-07-08 RX ORDER — BUTORPHANOL TARTRATE 2 MG/ML
1 INJECTION, SOLUTION INTRAMUSCULAR; INTRAVENOUS
Status: DISCONTINUED | OUTPATIENT
Start: 2025-07-08 | End: 2025-07-09 | Stop reason: HOSPADM

## 2025-07-08 RX ORDER — ONDANSETRON 4 MG/1
4 TABLET, ORALLY DISINTEGRATING ORAL EVERY 6 HOURS PRN
Status: DISCONTINUED | OUTPATIENT
Start: 2025-07-08 | End: 2025-07-09 | Stop reason: HOSPADM

## 2025-07-08 RX ORDER — ACETAMINOPHEN 325 MG/1
650 TABLET ORAL EVERY 6 HOURS
Status: DISCONTINUED | OUTPATIENT
Start: 2025-07-09 | End: 2025-07-09 | Stop reason: HOSPADM

## 2025-07-08 RX ORDER — MISOPROSTOL 200 UG/1
600 TABLET ORAL ONCE
Status: COMPLETED | OUTPATIENT
Start: 2025-07-09 | End: 2025-07-08

## 2025-07-08 RX ORDER — LIDOCAINE HYDROCHLORIDE 10 MG/ML
0.5 INJECTION, SOLUTION EPIDURAL; INFILTRATION; INTRACAUDAL; PERINEURAL ONCE AS NEEDED
Status: DISCONTINUED | OUTPATIENT
Start: 2025-07-08 | End: 2025-07-09 | Stop reason: HOSPADM

## 2025-07-08 RX ORDER — FENTANYL CITRATE 50 UG/ML
INJECTION, SOLUTION INTRAMUSCULAR; INTRAVENOUS
Status: COMPLETED | OUTPATIENT
Start: 2025-07-08 | End: 2025-07-08

## 2025-07-08 RX ORDER — ACETAMINOPHEN 325 MG/1
650 TABLET ORAL EVERY 4 HOURS PRN
Status: DISCONTINUED | OUTPATIENT
Start: 2025-07-08 | End: 2025-07-09 | Stop reason: HOSPADM

## 2025-07-08 RX ORDER — FAMOTIDINE 20 MG/1
20 TABLET, FILM COATED ORAL ONCE AS NEEDED
Status: DISCONTINUED | OUTPATIENT
Start: 2025-07-08 | End: 2025-07-09 | Stop reason: HOSPADM

## 2025-07-08 RX ORDER — OXYTOCIN/0.9 % SODIUM CHLORIDE 30/500 ML
250 PLASTIC BAG, INJECTION (ML) INTRAVENOUS CONTINUOUS
Status: ACTIVE | OUTPATIENT
Start: 2025-07-09 | End: 2025-07-09

## 2025-07-08 RX ORDER — SODIUM CHLORIDE, SODIUM LACTATE, POTASSIUM CHLORIDE, CALCIUM CHLORIDE 600; 310; 30; 20 MG/100ML; MG/100ML; MG/100ML; MG/100ML
125 INJECTION, SOLUTION INTRAVENOUS CONTINUOUS
Status: DISCONTINUED | OUTPATIENT
Start: 2025-07-08 | End: 2025-07-09

## 2025-07-08 RX ORDER — FAMOTIDINE 10 MG/ML
20 INJECTION, SOLUTION INTRAVENOUS ONCE AS NEEDED
Status: DISCONTINUED | OUTPATIENT
Start: 2025-07-08 | End: 2025-07-09 | Stop reason: HOSPADM

## 2025-07-08 RX ORDER — OXYTOCIN/0.9 % SODIUM CHLORIDE 30/500 ML
999 PLASTIC BAG, INJECTION (ML) INTRAVENOUS ONCE
Status: COMPLETED | OUTPATIENT
Start: 2025-07-08 | End: 2025-07-09

## 2025-07-08 RX ORDER — LIDOCAINE HYDROCHLORIDE AND EPINEPHRINE 15; 5 MG/ML; UG/ML
INJECTION, SOLUTION EPIDURAL
Status: COMPLETED | OUTPATIENT
Start: 2025-07-08 | End: 2025-07-08

## 2025-07-08 RX ORDER — SODIUM CHLORIDE 9 MG/ML
40 INJECTION, SOLUTION INTRAVENOUS AS NEEDED
Status: DISCONTINUED | OUTPATIENT
Start: 2025-07-08 | End: 2025-07-09 | Stop reason: HOSPADM

## 2025-07-08 RX ORDER — ONDANSETRON 2 MG/ML
4 INJECTION INTRAMUSCULAR; INTRAVENOUS EVERY 6 HOURS PRN
Status: DISCONTINUED | OUTPATIENT
Start: 2025-07-08 | End: 2025-07-09 | Stop reason: HOSPADM

## 2025-07-08 RX ORDER — TERBUTALINE SULFATE 1 MG/ML
0.25 INJECTION SUBCUTANEOUS AS NEEDED
Status: DISCONTINUED | OUTPATIENT
Start: 2025-07-08 | End: 2025-07-09 | Stop reason: HOSPADM

## 2025-07-08 RX ORDER — MAGNESIUM CARB/ALUMINUM HYDROX 105-160MG
30 TABLET,CHEWABLE ORAL ONCE
Status: DISCONTINUED | OUTPATIENT
Start: 2025-07-08 | End: 2025-07-09 | Stop reason: HOSPADM

## 2025-07-08 RX ORDER — ROPIVACAINE HYDROCHLORIDE 2 MG/ML
INJECTION, SOLUTION EPIDURAL; INFILTRATION; PERINEURAL
Status: COMPLETED | OUTPATIENT
Start: 2025-07-08 | End: 2025-07-08

## 2025-07-08 RX ORDER — TRANEXAMIC ACID 10 MG/ML
INJECTION, SOLUTION INTRAVENOUS
Status: DISCONTINUED
Start: 2025-07-08 | End: 2025-07-09 | Stop reason: WASHOUT

## 2025-07-08 RX ORDER — FENTANYL CITRATE 50 UG/ML
INJECTION, SOLUTION INTRAMUSCULAR; INTRAVENOUS
Status: COMPLETED
Start: 2025-07-08 | End: 2025-07-08

## 2025-07-08 RX ORDER — OXYTOCIN/0.9 % SODIUM CHLORIDE 30/500 ML
1 PLASTIC BAG, INJECTION (ML) INTRAVENOUS
Status: DISCONTINUED | OUTPATIENT
Start: 2025-07-08 | End: 2025-07-09

## 2025-07-08 RX ORDER — EPHEDRINE SULFATE 50 MG/ML
5 INJECTION, SOLUTION INTRAVENOUS
Status: DISCONTINUED | OUTPATIENT
Start: 2025-07-08 | End: 2025-07-09 | Stop reason: HOSPADM

## 2025-07-08 RX ADMIN — MISOPROSTOL 600 MCG: 200 TABLET ORAL at 23:49

## 2025-07-08 RX ADMIN — SODIUM CHLORIDE, POTASSIUM CHLORIDE, SODIUM LACTATE AND CALCIUM CHLORIDE 125 ML/HR: 600; 310; 30; 20 INJECTION, SOLUTION INTRAVENOUS at 16:04

## 2025-07-08 RX ADMIN — Medication 10 ML/HR: at 21:07

## 2025-07-08 RX ADMIN — OXYTOCIN 1 MILLI-UNITS/MIN: 10 INJECTION, SOLUTION INTRAMUSCULAR; INTRAVENOUS at 16:31

## 2025-07-08 RX ADMIN — FENTANYL CITRATE 100 MCG: 50 INJECTION, SOLUTION INTRAMUSCULAR; INTRAVENOUS at 21:06

## 2025-07-08 RX ADMIN — LIDOCAINE HYDROCHLORIDE AND EPINEPHRINE 3 ML: 15; 5 INJECTION, SOLUTION EPIDURAL at 21:01

## 2025-07-08 RX ADMIN — ROPIVACAINE HYDROCHLORIDE 8 ML: 2 INJECTION, SOLUTION EPIDURAL; INFILTRATION; PERINEURAL at 21:06

## 2025-07-08 NOTE — H&P
[facility]  Obstetric History and Physical    Chief Complaint   Patient presents with    Scheduled Induction       Subjective     Patient is a 35 y.o. female  currently at 37w2d, who presents for induction of labor from the office due to gestational hypertension.  Patient had an elevated blood pressure today and 1 other blood pressure last week.  She denies any headache, visual disturbance, right upper quadrant pain.  Patient has a history of gestational hypertension other pregnancies    [place] provided by:  [facility]. Her prenatal care is benign.  Her previous obstetric/gynecological history is noted for is non-contributory.    The following portions of the patients history were reviewed and updated as appropriate: current medications, allergies, past medical history, past surgical history, past family history, past social history, and problem list .       Prenatal Information:  Prenatal Results       Initial Prenatal Labs       Test Value Reference Range Date Time    Hemoglobin  13.0 g/dL 12.0 - 15.9 25 09    Hematocrit  37.8 % 34.0 - 46.6 25 09    Platelets  301 10*3/mm3 140 - 450 25 09    Rubella IgG  2.18 index Immune >0.99 25 09    Hepatitis B SAg  Non-Reactive  Non-Reactive 25 09    Hepatitis C Ab  Non-Reactive  Non-Reactive 25 09    RPR  Non-Reactive  Non-Reactive 25 09    T. Pallidum Ab         ABO  A   25 09    Rh  Positive   25 09    Antibody Screen  Negative   25 09    HIV  Non-Reactive  Non-Reactive 25 09    Urine Culture  No growth   25 0941    Gonorrhea  Not Detected  Not Detected  25 0941    Chlamydia  Not Detected  Not Detected  25 0941    TSH        HgB A1c         Varicella IgG        Hemoglobinopathy Fractionation        Hemoglobinopathy (genetic testing) ^ WNL   14     Cystic fibrosis         Spinal muscular atrophy        Fragile X                  Fetal testing        Test Value  Reference Range Date Time    NIPT        MSAFP        AFP-4                  2nd and 3rd Trimester       Test Value Reference Range Date Time    Hemoglobin (repeated)  10.3 g/dL 12.0 - 15.9 04/16/25 1025    Hematocrit (repeated)  31.3 % 34.0 - 46.6 04/16/25 1025    Platelets   267 10*3/mm3 140 - 450 04/16/25 1025       301 10*3/mm3 140 - 450 01/22/25 0959    1 hour GTT   154 mg/dL 65 - 139 04/16/25 1025    Antibody Screen (repeated)        3rd TM syphilis scrn (repeated)  RPR         3rd TM syphilis scrn (repeated) TP-Ab        3rd TM syphilis screen TB-Ab (FTA)        Syphilis cascade test TP-Ab (EIA)        Syphilis cascade TPPA        GTT Fasting ^ 78   05/07/25     GTT 1 Hr ^ 172   05/07/25     GTT 2 Hr ^ 154   05/07/25     GTT 3 Hr ^ 121   05/07/25     Group B Strep  Negative  Negative 07/01/25 1547              Other testing        Test Value Reference Range Date Time    Parvo IgG         CMV IgG                   Drug Screening       Test Value Reference Range Date Time    Amphetamine Screen  Negative  Negative 01/22/25 0941    Barbiturate Screen  Negative  Negative 01/22/25 0941    Benzodiazepine Screen  Negative  Negative 01/22/25 0941    Methadone Screen  Negative  Negative 01/22/25 0941    Phencyclidine Screen  Negative  Negative 01/22/25 0941    Opiates Screen  Negative  Negative 01/22/25 0941    THC Screen  Negative  Negative 01/22/25 0941    Cocaine Screen  Negative  Negative 01/22/25 0941    Propoxyphene Screen        Buprenorphine Screen  Negative  Negative 01/22/25 0941    Methamphetamine Screen  Negative  Negative 01/22/25 0941    Oxycodone Screen  Negative  Negative 01/22/25 0941    Tricyclic Antidepressants Screen  Negative  Negative 01/22/25 0941              Legend    ^: Historical                          External Prenatal Results       Pregnancy Outside Results - Transcribed From Office Records - See Scanned Records For Details       Test Value Date Time    ABO  A  01/22/25 0959    Rh   Positive  25 0959    Antibody Screen  Negative  25 0959    Varicella IgG       Rubella  2.18 index 25 0959    Hgb  10.3 g/dL 25 1025       13.0 g/dL 25 0959    Hct  31.3 % 25 1025       37.8 % 25 0959    HgB A1c        1h GTT  154 mg/dL 25 1025    3h GTT Fasting ^ 78  25     3h GTT 1 hour ^ 172  25     3h GTT 2 hour ^ 154  25     3h GTT 3 hour  ^ 121  25     Gonorrhea (discrete)  Not Detected  25    Chlamydia (discrete)  Not Detected  25    RPR  Non-Reactive  25 0959    Syphils cascade: TP-Ab (FTA)       TP-Ab       TP-Ab (EIA)       TPPA       HBsAg  Non-Reactive  25    Herpes Simplex Virus PCR       Herpes Simplex VIrus Culture       HIV  Non-Reactive  25 09    Hep C RNA Quant PCR       Hep C Antibody  Non-Reactive  25 0959    AFP       NIPT       Cystic Fibrosis ([place])       Cystic Fibroisis        Spinal Muscular atrophy       Fragile X       Group B Strep  Negative  25 1547    GBS Susceptibility to Clindamycin       GBS Susceptibility to Erythromycin       Fetal Fibronectin       Genetic Testing, Maternal Blood                 Drug Screening       Test Value Date Time    Urine Drug Screen       Amphetamine Screen  Negative  25 0941    Barbiturate Screen  Negative  25 0941    Benzodiazepine Screen  Negative  25 0941    Methadone Screen  Negative  25 0941    Phencyclidine Screen  Negative  25 0941    Opiates Screen  Negative  25 0941    THC Screen  Negative  25 0941    Cocaine Screen       Propoxyphene Screen       Buprenorphine Screen  Negative  25 0941    Methamphetamine Screen       Oxycodone Screen  Negative  25 0941    Tricyclic Antidepressants Screen  Negative  25 0941              Legend    ^: Historical                             Past OB History:     OB History    Para Term  AB Living   4 3 2 1 0 3    SAB IAB Ectopic Molar Multiple Live Births   0 0 0 0 0 3      # Outcome Date GA Lbr Nick/2nd Weight Sex Type Anes PTL Lv   4 Current            3 Term 22 37w5d / 168:18 3710 g (8 lb 2.9 oz) F Vag-Spont EPI N JACQUIE      Name: GRAHAM WILLSON      Apgar1: 8  Apgar5: 9   2 Term 10/12/18 38w0d   M Vaginal unsp   JACQUIE   1  07/02/15 34w0d   F Vaginal unsp   JACQUIE       Past Medical History: Past Medical History:   Diagnosis Date    Anxiety 2022    History of gestational hypertension     History of retained placenta 10/26/2021    Hyperemesis gravidarum     Irritable bowel syndrome 2022    Ulcer, gastric, acute     Urinary tract infection       Past Surgical History Past Surgical History:   Procedure Laterality Date    DILATATION AND CURETTAGE  2018    WISDOM TOOTH EXTRACTION        Family History: Family History   Problem Relation Age of Onset    Colon cancer Maternal Grandmother 70    Heart disease Maternal Grandfather     Hyperlipidemia Maternal Grandfather     Hypertension Maternal Grandfather     [place] disease Cousin     Breast cancer Neg Hx     Ovarian cancer Neg Hx     Uterine cancer Neg Hx     Malig Hyperthermia Neg Hx     Cervical cancer Neg Hx     Stomach cancer Neg Hx     Skin cancer Neg Hx     Pulmonary embolism Neg Hx     Deep vein thrombosis Neg Hx       Social History:  reports that she has never smoked. She has never used smokeless tobacco.   reports that she does not currently use alcohol.   reports no history of drug use.        General ROS: Pertinent items are noted in HPI    Objective       Vital Signs Range for the last 24 hours  Temperature:     Temp Source:     BP: BP: (122-132)/(87-97) 122/87   Pulse:     Respirations:     SPO2:     O2 Amount (l/min):     O2 Devices     Weight: Weight:  [92.5 kg (204 lb)] 92.5 kg (204 lb)     Physical Examination: General appearance - alert, well appearing, and in no distress  Mental status - alert, oriented to person, place, and time  Chest  - clear to auscultation, no wheezes, rales or rhonchi, symmetric air entry  Heart - normal rate, regular rhythm, normal S1, S2, no murmurs, rubs, clicks or gallops  Abdomen - soft, nontender, gravid  Extremities - peripheral pulses normal, 1+ pitting edema, no clubbing or cyanosis  Skin - normal coloration and turgor, no rashes, no suspicious skin lesions noted    Presentation: Vertex   Cervix: Exam by:     Dilation: 3 to 4 cm   Effacement: 80%   Station:         Fetal Heart Rate Assessment   Method:     Beats/min:     Baseline:     Variability:     Accels:     Decels:           Uterine Assessment   Method: Method: external tocotransducer, palpation   Frequency (min):     Ctx Count in 10 min:     Duration:     Intensity:         [place] Units:       GBS is negative      Assessment & Plan       Gestational hypertension, third trimester        Assessment:  1.  Intrauterine pregnancy at 37w2d gestation with reactive fetal status.    2.  Induction of labor due to gestational hypertension with favorable cervix  3.  Obstetrical history significant for is non-contributory.  4.  GBS status:   Group B Strep, DNA   Date Value Ref Range Status   2025 Negative Negative Final       Plan:  1. Vaginal anticipated  2. Plan of care has been reviewed with patient and patient agrees.   3.  Risks, benefits of treatment plan have been discussed.  4.  All questions have been answered.  5.  Pitocin augmentation of labor    Counseling:The patient was counseled on the risks, benefits and alternatives of Induction.  Risks reviewed, but are not limited to: bleeding, transfusion, fetal intolerance,  (possibly emergent),  and uterine rupture.  She declines expectant management or  and desires induction.  Reviewed risks w prematurity.  All her questions have been answered to her satisfaction and she desires to proceed.  Specifically with Cytotec, she understands that it is endorsed by the [employer] College of [employer]GY[employer],  but not FDA approved for the induction of labor.    Levar Martinez MD  7/8/2025  15:56 EDT

## 2025-07-09 PROCEDURE — 0503F POSTPARTUM CARE VISIT: CPT | Performed by: OBSTETRICS & GYNECOLOGY

## 2025-07-09 PROCEDURE — 51701 INSERT BLADDER CATHETER: CPT

## 2025-07-09 PROCEDURE — 25010000002 OXYTOCIN PER 10 UNITS: Performed by: OBSTETRICS & GYNECOLOGY

## 2025-07-09 PROCEDURE — 51703 INSERT BLADDER CATH COMPLEX: CPT

## 2025-07-09 PROCEDURE — 59025 FETAL NON-STRESS TEST: CPT

## 2025-07-09 PROCEDURE — 25810000003 SODIUM CHLORIDE 0.9 % SOLUTION: Performed by: OBSTETRICS & GYNECOLOGY

## 2025-07-09 RX ORDER — ONDANSETRON 2 MG/ML
4 INJECTION INTRAMUSCULAR; INTRAVENOUS EVERY 6 HOURS PRN
Status: DISCONTINUED | OUTPATIENT
Start: 2025-07-09 | End: 2025-07-10 | Stop reason: HOSPADM

## 2025-07-09 RX ORDER — ONDANSETRON 4 MG/1
4 TABLET, ORALLY DISINTEGRATING ORAL EVERY 6 HOURS PRN
Status: DISCONTINUED | OUTPATIENT
Start: 2025-07-09 | End: 2025-07-10 | Stop reason: HOSPADM

## 2025-07-09 RX ORDER — ACETAMINOPHEN 325 MG/1
650 TABLET ORAL EVERY 6 HOURS PRN
Status: DISCONTINUED | OUTPATIENT
Start: 2025-07-09 | End: 2025-07-10 | Stop reason: HOSPADM

## 2025-07-09 RX ORDER — MISOPROSTOL 200 UG/1
200 TABLET ORAL EVERY 6 HOURS SCHEDULED
Status: DISPENSED | OUTPATIENT
Start: 2025-07-09 | End: 2025-07-09

## 2025-07-09 RX ORDER — MISOPROSTOL 200 UG/1
200 TABLET ORAL EVERY 6 HOURS SCHEDULED
Status: DISCONTINUED | OUTPATIENT
Start: 2025-07-09 | End: 2025-07-09

## 2025-07-09 RX ORDER — HYDROCODONE BITARTRATE AND ACETAMINOPHEN 5; 325 MG/1; MG/1
1 TABLET ORAL EVERY 4 HOURS PRN
Status: DISCONTINUED | OUTPATIENT
Start: 2025-07-09 | End: 2025-07-10 | Stop reason: HOSPADM

## 2025-07-09 RX ORDER — OXYTOCIN/0.9 % SODIUM CHLORIDE 30/500 ML
125 PLASTIC BAG, INJECTION (ML) INTRAVENOUS ONCE AS NEEDED
Status: DISCONTINUED | OUTPATIENT
Start: 2025-07-09 | End: 2025-07-10 | Stop reason: HOSPADM

## 2025-07-09 RX ORDER — IBUPROFEN 600 MG/1
600 TABLET, FILM COATED ORAL EVERY 6 HOURS PRN
Status: DISCONTINUED | OUTPATIENT
Start: 2025-07-09 | End: 2025-07-10 | Stop reason: HOSPADM

## 2025-07-09 RX ORDER — HYDROCODONE BITARTRATE AND ACETAMINOPHEN 10; 325 MG/1; MG/1
1 TABLET ORAL EVERY 4 HOURS PRN
Status: DISCONTINUED | OUTPATIENT
Start: 2025-07-09 | End: 2025-07-10 | Stop reason: HOSPADM

## 2025-07-09 RX ORDER — DOCUSATE SODIUM 100 MG/1
100 CAPSULE, LIQUID FILLED ORAL 2 TIMES DAILY
Status: DISCONTINUED | OUTPATIENT
Start: 2025-07-09 | End: 2025-07-10 | Stop reason: HOSPADM

## 2025-07-09 RX ADMIN — MISOPROSTOL 200 MCG: 200 TABLET ORAL at 12:10

## 2025-07-09 RX ADMIN — IBUPROFEN 600 MG: 600 TABLET, FILM COATED ORAL at 21:52

## 2025-07-09 RX ADMIN — ACETAMINOPHEN 650 MG: 325 TABLET ORAL at 01:26

## 2025-07-09 RX ADMIN — BENZOCAINE AND LEVOMENTHOL: 200; 5 SPRAY TOPICAL at 01:27

## 2025-07-09 RX ADMIN — OXYTOCIN 999 ML/HR: 10 INJECTION, SOLUTION INTRAMUSCULAR; INTRAVENOUS at 00:02

## 2025-07-09 RX ADMIN — SODIUM CHLORIDE 250 ML/HR: 9 INJECTION, SOLUTION INTRAVENOUS at 00:02

## 2025-07-09 RX ADMIN — WITCH HAZEL: 500 SOLUTION RECTAL; TOPICAL at 01:27

## 2025-07-09 RX ADMIN — SODIUM CHLORIDE 250 ML/HR: 9 INJECTION, SOLUTION INTRAVENOUS at 00:05

## 2025-07-09 RX ADMIN — MISOPROSTOL 200 MCG: 200 TABLET ORAL at 00:58

## 2025-07-09 RX ADMIN — MISOPROSTOL 200 MCG: 200 TABLET ORAL at 08:14

## 2025-07-09 NOTE — ANESTHESIA POSTPROCEDURE EVALUATION
Patient: Deborah WILLSON    Procedure Summary       Date: 07/08/25 Room / Location:     Anesthesia Start: 2053 Anesthesia Stop: 2258    Procedure: LABOR ANALGESIA Diagnosis:     Scheduled Providers:  Provider: Amanda Moran CRNA    Anesthesia Type: epidural ASA Status: 2            Anesthesia Type: epidural    Vitals  Vitals Value Taken Time   /86 07/09/25 08:57   Temp 36.9 °C (98.4 °F) 07/09/25 08:57   Pulse 81 07/09/25 08:57   Resp 18 07/09/25 08:57   SpO2 94 % 07/09/25 01:25   Vitals shown include unfiled device data.        Post Anesthesia Care and Evaluation    Patient location during evaluation: bedside  Patient participation: complete - patient participated  Level of consciousness: awake  Pain management: adequate    Airway patency: patent  Anesthetic complications: No anesthetic complications  PONV Status: none  Cardiovascular status: acceptable and stable  Respiratory status: acceptable  Hydration status: acceptable  Post Neuraxial Block status: Motor and sensory function returned to baseline and No signs or symptoms of PDPH

## 2025-07-09 NOTE — ANESTHESIA PROCEDURE NOTES
Labor Epidural    Pre-sedation assessment completed: 7/8/2025 8:52 PM    Patient reassessed immediately prior to procedure    Patient location during procedure: OB  Start Time: 7/8/2025 8:53 PM  Stop Time: 7/8/2025 9:11 PM  Performed By  CRNA/CAA: Amanda Moran CRNA  Prep:  Pt Position:sitting  Sterile Tech:gloves, mask and sterile barrier  Prep:povidone-iodine 7.5% surgical scrub  Monitoring:blood pressure monitoring and continuous pulse oximetry  Epidural Block Procedure:  Approach:midline  Guidance:landmark technique and palpation technique  Location:L4-L5  Needle Type:Tuohy  Needle Gauge:17 G  Loss of Resistance Medium: saline  Loss of Resistance: 5cm  Cath Depth at skin:10 cm  Paresthesia: none  Aspiration:negative  Test Dose:negative  Medication: ropivacaine (NAROPIN) 0.2 % injection - Injection   8 mL - 7/8/2025 9:06:00 PM  fentaNYL citrate (PF) (SUBLIMAZE) injection - Epidural   100 mcg - 7/8/2025 9:06:00 PM  lidocaine 1.5%-EPINEPHrine 1:200,000 (XYLOCAINE W/EPI) injection - Epidural   3 mL - 7/8/2025 9:01:00 PM  Number of Attempts: 1  Post Assessment:  Dressing:biopatch applied, occlusive dressing applied and secured with tape  Pt Tolerance:patient tolerated the procedure well with no apparent complications  Complications:no

## 2025-07-09 NOTE — ANESTHESIA PREPROCEDURE EVALUATION
Anesthesia Evaluation     Patient summary reviewed   no history of anesthetic complications:   NPO Solid Status: N/A  NPO Liquid Status: N/A           Airway   Mallampati: II  TM distance: >3 FB  Neck ROM: full  No difficulty expected  Dental - normal exam     Pulmonary - negative pulmonary ROS and normal exam   Cardiovascular - normal exam    (+) hypertension      Neuro/Psych  (+) psychiatric history Anxiety  GI/Hepatic/Renal/Endo      ROS Comment: History of gastric ulcer     Musculoskeletal (-) negative ROS    Abdominal  - normal exam   Substance History - negative use     OB/GYN    (+) Pregnant        Other          Other Comment: Maternal anemia               Anesthesia Plan    ASA 2     epidural       Anesthetic plan, risks, benefits, and alternatives have been provided, discussed and informed consent has been obtained with: patient.  Pre-procedure education provided  Plan discussed with CRNA.    CODE STATUS:    Code Status (Patient has no pulse and is not breathing): CPR (Attempt to Resuscitate)  Medical Interventions (Patient has pulse or is breathing): Full Support  Level Of Support Discussed With: Patient

## 2025-07-09 NOTE — PLAN OF CARE
Goal Outcome Evaluation:  Plan of Care Reviewed With: patient        Progress: improving  Outcome Evaluation: Patient ambulating independently, voiding spontaneously and without difficulty. Fundus firm, 1cm below umbilicus with light lochia noted. VSS.

## 2025-07-09 NOTE — L&D DELIVERY NOTE
" [facility]  Vaginal Delivery Note    Delivery     Delivery: Vaginal, Spontaneous    YOB: 2023   Time of Birth:  Gestational Age 10:58 PM  37w2d     Anesthesia: Epidural    Delivering clinician: Jackelyn Hopson   Forceps?   No   Vacuum? No    Shoulder dystocia present: No        Delivery narrative:  I was called to the room as the patient was complete complete +3 station.  The patient underwent a spontaneous vaginal delivery of a viable male  at 2258 hrs.  The infant was bulb suctioned prior to delivery the shoulders.  The cord was clamped x2 and cut after 60 seconds.  A specimen for the arterial cord pH and cord blood was obtained.  The placenta was delivered spontaneously and intact.  The weight was 9 pounds and 6.6 ounces and the Apgars were 7 and 8.  Episiotomy was not performed.  The rectal, vaginal and cervical exams were within normal limits.  The infant was in the warmer and mom was in recovery in stable and satisfactory condition.  The sponge counts and instrument counts were verified as correct.    Infant    Findings: male infant     Infant observations: Weight: 4270 g (9 lb 6.6 oz)  Length: 20 in  Observations/Comments:        Apgars: 7  @ 1 minute /    8  @ 5 minutes         Placenta, Cord, and Fluid    Placenta delivered  Spontaneous at   2025 11:03 PM    Cord: 3 vessels present.   Nuchal Cord?  no   Cord blood obtained: Yes   Cord gases obtained:  Yes   Cord gas results: Venous:  No results found for: \"PHCVEN\", \"BECVEN\"    Arterial:    pH, Cord Arterial   Date Value Ref Range Status   2025 7.39 (H) 7.18 - 7.34 pH Units Final     Base Exc, Cord Arterial   Date Value Ref Range Status   2025 -3.6 (L) -2.0 - 2.0 mmol/L Final     Comment:     Serial Number: [id number]:  [id number]        Repair    Episiotomy: None    No    Lacerations: No   Estimated Blood Loss: Est. Blood Loss (mL): 250 mL (Filed from Delivery Summary) (25)           Complications  Gestational " hypertension    Disposition  Mother to Mother Baby/Postpartum  in stable condition currently.  Baby to remains with mom  in stable condition currently.      Jackelyn Hopson DO  07/09/25  07:54 EDT

## 2025-07-09 NOTE — LACTATION NOTE
LC in to assist with this first pumping session. MARITZA provided her a hospital pump and sized her to the 24 mm flange. Her nipples are inverted but marcus easily with pumping. She was not able to collect any colostrum at this feeding. She states pumping was not painful. MARITZA discussed normal expectations of pumping labeling. MARITZA provided a schedule for her and put it on her dry erase board to help remind her.

## 2025-07-09 NOTE — PLAN OF CARE
Goal Outcome Evaluation:              Outcome Evaluation: Patient performs self care. Goes to the NICU to visit infant during care time. Pain minimal. Voiding spontanneously.

## 2025-07-09 NOTE — PROGRESS NOTES
PostPartum/PostOp PROGRESS NOTE        Subjective:  Patient has no complaints  Pain controlled  Tolerating a regular diet  Passing flatus  Ambulating  Urinating spontaneously  Denies HA, vision change, or RUQ/epigastric pain  No lightheadedness or dizziness    Objective:  Vitals: Blood pressure 126/85, pulse 84, temperature 98.5 °F (36.9 °C), temperature source Oral, resp. rate 16, last menstrual period 10/28/2024, SpO2 94%, not currently breastfeeding.          General: NAD, alert and oriented, appropriate  Psych: Normal mood, appropriate  Abdomen: Fundus firm, non tender and Fundus at U-1  Extremeties: Trace edema    Labs:  Lab Results (last 24 hours)       Procedure Component Value Units Date/Time    POC Urinalysis Dipstick [[id number]] Collected: 07/08/25 1044    Specimen: Urine Updated: 07/08/25 1044     Glucose, UA Negative mg/dL      Protein, POC Negative mg/dL     CBC (No Diff) [[id number]]  (Abnormal) Collected: 07/08/25 1134    Specimen: Blood from Arm, Right Updated: 07/08/25 2307     WBC 8.10 10*3/mm3      RBC 3.68 10*6/mm3      Hemoglobin 10.4 g/dL      Hematocrit 31.7 %      MCV 86.1 fL      MCH 28.3 pg      MCHC 32.8 g/dL      RDW 13.4 %      RDW-SD 41.2 fl      MPV 10.1 fL      Platelets 270 10*3/mm3     Vitamin B12 & Folate [[id number]]  (Abnormal) Collected: 07/08/25 1134    Specimen: Blood from Arm, Right Updated: 07/08/25 2241     Folate 11.90 ng/mL      Vitamin B-12 186 pg/mL     Narrative:      Results may be falsely increased if patient taking Biotin.      Vitamin D,25-Hydroxy [[id number]]  (Normal) Collected: 07/08/25 1134    Specimen: Blood from Arm, Right Updated: 07/08/25 2241     25 Hydroxy, Vitamin D 39.9 ng/ml     Narrative:      Reference Range for Total Vitamin D 25(OH)     Deficiency <20.0 ng/mL   Insufficiency 21-29 ng/mL   Sufficiency  ng/mL  Toxicity >100 ng/ml      CBC (No Diff) [[id number]]  (Abnormal) Collected: 07/08/25 1615    Specimen: Blood Updated: 07/08/25 1629     WBC  8.52 10*3/mm3      RBC 3.38 10*6/mm3      Hemoglobin 9.5 g/dL      Hematocrit 29.7 %      MCV 87.9 fL      MCH 28.1 pg      MCHC 32.0 g/dL      RDW 14.1 %      RDW-SD 44.9 fl      MPV 10.7 fL      Platelets 261 10*3/mm3     Treponema pallidum AB w/Reflex RPR [[id number]]  (Normal) Collected: 07/08/25 1615    Specimen: Blood Updated: 07/08/25 1700     Treponemal AB Total Non-Reactive    Narrative:      Reactive results will reflex RPR testing.    Urine Drug Screen - Urine, Clean Catch [[id number]]  (Normal) Collected: 07/08/25 1615    Specimen: Urine, Clean Catch Updated: 07/08/25 1722     THC, Screen, Urine Negative     Phencyclidine (PCP), Urine Negative     Cocaine Screen, Urine Negative     Methamphetamine, Ur Negative     Opiate Screen Negative     Amphetamine Screen, Urine Negative     Benzodiazepine Screen, Urine Negative     Tricyclic Antidepressants Screen Negative     Methadone Screen, Urine Negative     Barbiturates Screen, Urine Negative     Oxycodone Screen, Urine Negative     Buprenorphine, Screen, Urine Negative    Narrative:      Cutoff For Drugs Screened:    Amphetamines               500 ng/ml  Barbiturates               200 ng/ml  Benzodiazepines            150 ng/ml  Cocaine                    150 ng/ml  Methadone                  200 ng/ml  Opiates                    100 ng/ml  Phencyclidine               25 ng/ml  THC                         50 ng/ml  Methamphetamine            500 ng/ml  Tricyclic Antidepressants  300 ng/ml  Oxycodone                  100 ng/ml  Buprenorphine               10 ng/ml    The normal value for all drugs tested is negative. This report includes unconfirmed screening results, with the cutoff values listed, to be used for medical treatment purposes only.  Unconfirmed results must not be used for non-medical purposes such as employment or legal testing.  Clinical consideration should be applied to any drug of abuse test, particularly when unconfirmed results are used.       Comprehensive Metabolic Panel [[id number]]  (Abnormal) Collected: 07/08/25 1615    Specimen: Blood Updated: 07/08/25 1655     Glucose 78 mg/dL      BUN 7.8 mg/dL      Creatinine 0.46 mg/dL      Sodium 138 mmol/L      Potassium 3.8 mmol/L      Chloride 106 mmol/L      CO2 19.3 mmol/L      Calcium 9.4 mg/dL      Total Protein 6.4 g/dL      Albumin 3.7 g/dL      ALT (SGPT) 7 U/L      AST (SGOT) 13 U/L      Alkaline Phosphatase 131 U/L      Total Bilirubin 0.2 mg/dL      Globulin 2.7 gm/dL      A/G Ratio 1.4 g/dL      BUN/Creatinine Ratio 17.0     Anion Gap 12.7 mmol/L      eGFR 129.8 mL/min/1.73     Narrative:      GFR Categories in Chronic Kidney Disease (CKD)              GFR Category          GFR (mL/min/1.73)    Interpretation  G1                    90 or greater        Normal or high (1)  G2                    60-89                Mild decrease (1)  G3a                   45-59                Mild to moderate decrease  G3b                   30-44                Moderate to severe decrease  G4                    15-29                Severe decrease  G5                    14 or less           Kidney failure    (1)In the absence of evidence of kidney disease, neither GFR category G1 or G2 fulfill the criteria for CKD.    eGFR calculation 2021 CKD-EPI creatinine equation, which does not include race as a factor    Protein / Creatinine Ratio, Urine - Urine, Clean Catch [[id number]] Collected: 07/08/25 1615    Specimen: Urine, Clean Catch Updated: 07/08/25 1650     Creatinine, Urine 192.5 mg/dL      Total Protein, Urine 33.8 mg/dL      Protein/Creatinine Ratio, Urine 0.18    Fentanyl, Urine - Urine, Clean Catch [[id number]]  (Normal) Collected: 07/08/25 1615    Specimen: Urine, Clean Catch Updated: 07/08/25 1651     Fentanyl, Urine Negative    Narrative:      Negative Threshold:      Fentanyl 5 ng/mL     The normal value for the drug tested is negative. This report includes final unconfirmed screening results to be used  for medical treatment purposes only. Unconfirmed results must not be used for non-medical purposes such as employment or legal testing. Clinical consideration should be applied to any drug of abuse test, particularly when unconfirmed results are used.           Blood Gas, Arterial, Cord [[id number]]  (Abnormal) Collected: 25    Specimen: Cord Blood Arterial from Umbilical Cord Updated: 25     pH, Cord Arterial 7.39 pH Units      pCO2, Cord Arterial 34.8 mmHg      pO2, Cord Arterial 32.8 mmHg      HCO3, Cord Arterial 20.9 mmol/L      Base Exc, Cord Arterial -3.6 mmol/L      Comment: Serial Number: [id number]:  [id number]        O2 Sat, Cord Arterial 62.9 %      Barometric Pressure for Blood Gas 742.8000 mmHg      Modality Room Air     FIO2 21 %               Assessment:    Post-partum/postop Day:  1  Status post       Plan:   Routine postpartum/postop care  Breast feeding support      Electronically signed by Jackelyn Hopson DO, 25, 7:58 AM EDT.

## 2025-07-10 VITALS
DIASTOLIC BLOOD PRESSURE: 85 MMHG | HEART RATE: 84 BPM | TEMPERATURE: 98.3 F | RESPIRATION RATE: 18 BRPM | OXYGEN SATURATION: 94 % | SYSTOLIC BLOOD PRESSURE: 118 MMHG

## 2025-07-10 PROCEDURE — 0503F POSTPARTUM CARE VISIT: CPT | Performed by: OBSTETRICS & GYNECOLOGY

## 2025-07-10 RX ORDER — ACETAMINOPHEN 325 MG/1
650 TABLET ORAL EVERY 6 HOURS PRN
Qty: 30 TABLET | Refills: 0 | Status: SHIPPED | OUTPATIENT
Start: 2025-07-10

## 2025-07-10 RX ORDER — IBUPROFEN 600 MG/1
600 TABLET, FILM COATED ORAL EVERY 6 HOURS PRN
Qty: 30 TABLET | Refills: 0 | Status: SHIPPED | OUTPATIENT
Start: 2025-07-10

## 2025-07-10 RX ADMIN — DOCUSATE SODIUM 100 MG: 100 CAPSULE, LIQUID FILLED ORAL at 09:25

## 2025-07-10 NOTE — LACTATION NOTE
Mom is planning on discharge today. LC discussed storage and cleaning guidelines for the NICU and how to bring milk to the NICU (on ice). LC reminded mom the importance of pumping both breasts every 3 hours. LC discussed importance that pumping should not be painful and expected breast changes and management of engorgement.LC discussed checking to make sure new medications are safe to breastfeed. LC discussed alcohol use and cigarette/second hand smoke around baby and breastfeeding and discussed the impact of street drugs on infants and breastfeeding. LC used the page in the breastfeeding guide to discuss harmful effects of these. Breastfeeding/Lactation expectations and anticipatory guidance discussed for the next two weeks . LC discussed nipple care, plugged ducts, engorgement, and breast infection. Mom demonstrated good understanding

## 2025-07-10 NOTE — PROGRESS NOTES
PostPartum/PostOp PROGRESS NOTE        Subjective:  Patient has no complaints  Baby in NICU      Objective:  Vitals: Blood pressure 102/67, pulse 74, temperature 97.6 °F (36.4 °C), temperature source Oral, resp. rate 18, last menstrual period 10/28/2024, SpO2 94%, not currently breastfeeding.          General: NAD, alert and oriented, appropriate  Psych: Normal mood, appropriate  Abdomen: Fundus firm, non tender and Fundus at U-2  Extremeties: Trace edema    Labs:  Lab Results (last 24 hours)       ** No results found for the last 24 hours. **              Assessment:    Post-partum/postop Day:  2  Status post       Plan:   Routine postpartum/postop care  Breast feeding support, Discharge home, DC meds reviewed, Follow up scheduled, HTN precautions reviewed in detail.  Questions answered.  Recommend immediate evaluation on L+D for HA not relieved w tylenol, vision changes, epig/RUQ pain, or Bps elevated at home., OB Hospitalist will see pt tomorrow and until discharge.  Plan discharge       Electronically signed by Jackelyn Hopson DO, 07/10/25, 8:50 AM EDT.

## 2025-07-10 NOTE — PLAN OF CARE
Goal Outcome Evaluation:              Outcome Evaluation: Assessment and vital signs WNL. Infant being transferred to [facility].

## 2025-07-10 NOTE — DISCHARGE SUMMARY
GYN Discharge Summary      Admit Date:  2025  Discharge Date: 07/10/25    Reason for Admission/Chief Complaint:   IUP at 37.2 weeks  Gestational hypertension  Obesity    Final Diagnosis:  Same  To do at FU:   Pending Results       None            Hospital Course/Signifcant Findings/Treatment/Procedure performed:    On the day of discharge POSTPARTUM pt tolerating a regular diet, ambulating, pain well controlled, urinating spontaneously and lochia appropriate.   Vital signs were stable and afebrile.  Exam was within normal limits.  Fundus was below umbilicus and nontender.  Meeting discharge criteria and desired discharge home.  Postpartum instructions and FU reviewed and questions answered.    Results from last 7 days   Lab Units 25  1615 25  1134   WBC 10*3/mm3 8.52 8.10   HEMOGLOBIN g/dL 9.5* 10.4*   HEMATOCRIT % 29.7* 31.7*   PLATELETS 10*3/mm3 261 270       Results from last 7 days   Lab Units 25  1615   GLUCOSE mg/dL 78   CREATININE mg/dL 0.46*   SODIUM mmol/L 138   POTASSIUM mmol/L 3.8   CHLORIDE mmol/L 106   AST (SGOT) U/L 13   ALT (SGPT) U/L 7       Results for orders placed or performed during the hospital encounter of 25   CBC (No Diff)    Collection Time: 25  4:15 PM    Specimen: Blood   Result Value Ref Range    WBC 8.52 3.40 - 10.80 10*3/mm3    RBC 3.38 (L) 3.77 - 5.28 10*6/mm3    Hemoglobin 9.5 (L) 12.0 - 15.9 g/dL    Hematocrit 29.7 (L) 34.0 - 46.6 %    MCV 87.9 79.0 - 97.0 fL    MCH 28.1 26.6 - 33.0 pg    MCHC 32.0 31.5 - 35.7 g/dL    RDW 14.1 12.3 - 15.4 %    RDW-SD 44.9 37.0 - 54.0 fl    MPV 10.7 6.0 - 12.0 fL    Platelets 261 140 - 450 10*3/mm3   Treponema pallidum AB w/Reflex RPR    Collection Time: 25  4:15 PM    Specimen: Blood   Result Value Ref Range    Treponemal AB Total Non-Reactive Non-Reactive   Urine Drug Screen - Urine, Clean Catch    Collection Time: 25  4:15 PM    Specimen: Urine, Clean Catch   Result Value Ref Range    THC, Screen,  Urine Negative Negative    Phencyclidine (PCP), Urine Negative Negative    Cocaine Screen, Urine Negative Negative    Methamphetamine, Ur Negative Negative    Opiate Screen Negative Negative    Amphetamine Screen, Urine Negative Negative    Benzodiazepine Screen, Urine Negative Negative    Tricyclic Antidepressants Screen Negative Negative    Methadone Screen, Urine Negative Negative    Barbiturates Screen, Urine Negative Negative    Oxycodone Screen, Urine Negative Negative    Buprenorphine, Screen, Urine Negative Negative   Comprehensive Metabolic Panel    Collection Time: 07/08/25  4:15 PM    Specimen: Blood   Result Value Ref Range    Glucose 78 65 - 99 mg/dL    BUN 7.8 6.0 - 20.0 mg/dL    Creatinine 0.46 (L) 0.57 - 1.00 mg/dL    Sodium 138 136 - 145 mmol/L    Potassium 3.8 3.5 - 5.2 mmol/L    Chloride 106 98 - 107 mmol/L    CO2 19.3 (L) 22.0 - 29.0 mmol/L    Calcium 9.4 8.6 - 10.5 mg/dL    Total Protein 6.4 6.0 - 8.5 g/dL    Albumin 3.7 3.5 - 5.2 g/dL    ALT (SGPT) 7 1 - 33 U/L    AST (SGOT) 13 1 - 32 U/L    Alkaline Phosphatase 131 (H) 39 - 117 U/L    Total Bilirubin 0.2 0.0 - 1.2 mg/dL    Globulin 2.7 gm/dL    A/G Ratio 1.4 g/dL    BUN/Creatinine Ratio 17.0 7.0 - 25.0    Anion Gap 12.7 5.0 - 15.0 mmol/L    eGFR 129.8 >60.0 mL/min/1.73   Protein / Creatinine Ratio, Urine - Urine, Clean Catch    Collection Time: 07/08/25  4:15 PM    Specimen: Urine, Clean Catch   Result Value Ref Range    Creatinine, Urine 192.5 mg/dL    Total Protein, Urine 33.8 mg/dL    Protein/Creatinine Ratio, Urine 0.18    Fentanyl, Urine - Urine, Clean Catch    Collection Time: 07/08/25  4:15 PM    Specimen: Urine, Clean Catch   Result Value Ref Range    Fentanyl, Urine Negative Negative   Type & Screen    Collection Time: 07/08/25  4:15 PM    Specimen: Blood   Result Value Ref Range    ABO Type A     RH type Positive     Antibody Screen Negative     T&S Expiration Date 7/11/2025 11:59:59 PM    Blood Gas, Arterial, Cord    Collection  "Time: 25 11:18 PM    Specimen: Umbilical Cord; Cord Blood Arterial   Result Value Ref Range    pH, Cord Arterial 7.39 (H) 7.18 - 7.34 pH Units    pCO2, Cord Arterial 34.8 33.0 - 49.0 mmHg    pO2, Cord Arterial 32.8 mmHg    HCO3, Cord Arterial 20.9 mmol/L    Base Exc, Cord Arterial -3.6 (L) -2.0 - 2.0 mmol/L    O2 Sat, Cord Arterial 62.9 %    Barometric Pressure for Blood Gas 742.8000 mmHg    Modality Room Air     FIO2 21 %       [unfilled]                 No results found for: \"FIBRINOGEN\"    US Ob < 14 Weeks Single or First Gestation  Result Date: 2025  [facility]:  25 Patient:  Deborah WILLSON    MR#:[id number] 35 y.o.   at 37w2d with Estimated Date of Delivery: 25 Indication: Placental lakes Comparison: None Method: Transabdominal Findings: Intrauterine pregnancy at 37w2d 7 pounds 9 ounces, 79.4%.  AC more than 99% TONYA 19 HC/AC 0.89, normal range 0.92-1.05 Fetal heart rate 137 Vertex, active during scan Posterior placenta, grade 3, no placental lakes seen Male fetus Impression: Normal growth ultrasound bordering on robust growth with large AC and slightly low HC/AC. See scanned in copy for complete details Electronically signed by Layla Little DO, 25, 1:25 PM EDT. [facility] [address] [place] [address] Dept: [phone] Dept Fax: [phone] Loc: [phone] Loc Fax: [phone]     US Ob 14 + Weeks Single or First Gestation  Result Date: 2025  Date of study: 6/10/2025 Indications: Placental lakes Comparisons: None Techniques: Transabdominal ultrasound There is a viable dolan intrauterine gestation in the cephalic presentation.  The estimated fetal weight is 2514 g, or 5 pounds 9 ounces.  This is the 84th percentile for gestational age.  The TONYA is 20.56 cm.  The fetal heart rate is 146 bpm.  The placenta is posterior, grade 2.  " There is no evidence of low-lying placenta or placenta previa.  Once again a single placental lake is noted.  It measures 5.21 cm x 1.11 cm.  No anatomical abnormalities are noted on today's limited study.  A robust rate of fetal growth is noted.  Recommend follow-up studies as clinically indicated. Electronically signed by Guzman Engle MD, 06/13/25, 7:32 AM EDT.         Discharge Medications        New Medications        Instructions Start Date   acetaminophen 325 MG tablet  Commonly known as: TYLENOL   650 mg, Oral, Every 6 Hours PRN      ibuprofen 600 MG tablet  Commonly known as: ADVIL,MOTRIN   600 mg, Oral, Every 6 Hours PRN             Continue These Medications        Instructions Start Date   ferrous sulfate 325 (65 FE) MG tablet   325 mg, Oral, Every Other Day             Stop These Medications      aspirin 81 MG EC tablet     ondansetron 4 MG tablet  Commonly known as: Zofran     vitamin B-6 25 MG tablet  Commonly known as: PYRIDOXINE               Diet: Regular      Activity: Pelvic Rest: 6  weeks  No heavy lifting >20lbs  No tub bath or pool   weeks     Condition at discharge: Good      Follow up with: Dr. Jackelyn Hopson DO, or provider of her choice     Follow up in: 5 weeks     Complications: None      Disposition: Home

## 2025-07-10 NOTE — PLAN OF CARE
Goal Outcome Evaluation:  Plan of Care Reviewed With: patient        Progress: improving  Outcome Evaluation: Pt is up ad eileen and travels to NICU frequently. Pain is well controlled with prn tylenol and motrin. Fundus is firm with scant to light bleeding. Refused cytotec, history of postpartum hemmorhage. Decided to keep IV in till am.

## 2025-07-10 NOTE — DISCHARGE INSTRUCTIONS
NO SEX or tampons for six weeks.    NO DRIVING for TWO WEEKS, or while taking narcotic pain medication.    NO TUB BATH or POOL for FOUR weeks, shower only    NO LIFTING more than 20 punds for 2 weeks.      Vaginal Bleeding: may continue on and off over the next several weeks after delivery and may increase slightly once home. You should not be bleeding more than one large pad soaked every hour or two. Clots even the size of a lemon or larger may be normal as long as bleeding is not heavy and clots do not continue.    FEVER or CHILLS or NOT FEELING WELL: call you provider, go to ER if after hours.    CHEST PAIN or SHORTNESS of BREATH/AIR: you need to go to the nearest ER or CALL 911.    SWELLING: can increase over the next 7-10 days and then should slowly improve. Your legs and ankles should be fairly similar in size. A red, painful, hot, swollen leg (usually just one side) can be a sign of a blood clot and should be evaluated immediately. CALL Provider or go to ER immediately if after hours.    ELEVATED BLOOD PRESSURE: you need to contact provider if you are having persistent elevated BP systolic (top number) more than 155 or diastolic (bottom number) more than 95. Or a headache not relieved with rest, hydration or over the counter pain reliever, an increase in your swelling (usually hands and face), changes in your vision (typically flashing white or black spots) or severe persistent pain in the location of the upper right side of your belly (under your right breast). Call your provider or go to ER after hours.    LACTATION QUESTIONS or CONCERNS? Call [facility] at [phone].

## 2025-07-11 ENCOUNTER — MATERNAL SCREENING (OUTPATIENT)
Dept: CALL CENTER | Facility: HOSPITAL | Age: 35
End: 2025-07-11
Payer: COMMERCIAL

## 2025-07-11 NOTE — OUTREACH NOTE
Maternal Screening Survey      Flowsheet Row Responses   Eligibility Eligible   Prep survey completed? Yes   Facility patient discharged from? [facility] - Registered Nurse

## 2025-07-21 ENCOUNTER — MATERNAL SCREENING (OUTPATIENT)
Dept: CALL CENTER | Facility: HOSPITAL | Age: 35
End: 2025-07-21
Payer: COMMERCIAL

## 2025-07-21 NOTE — OUTREACH NOTE
Maternal Screening Survey      Flowsheet Row Responses   Facility patient discharged from? [facility]   Attempt successful? Yes   Call start time 1359   Call end time 1401   I have been able to laugh and see the funny side of things. 0   I have looked forward with enjoyment to things. 0   I have blamed myself unnecessarily when things went wrong. 0   I have been anxious or worried for no good reason. 1   I have felt scared or panicky for no good reason. 0   Things have been getting on top of me. 0   I have been so unhappy that I have had difficulty sleeping. 0   I have felt sad or miserable. 0   I have been so unhappy that I have been crying. 0   The thought of harming myself has occurred to me. 0   [employer]  Depression Scale Total 1   Did any of your parents have problems with alcohol or drug use? No   Do any of your peers have problems with alcohol or drug use? No   Does your partner have problems with alcohol or drug use? No   Before you were pregnant did you have problems with alcohol or drug use? (past) No   In the past month, did you drink beer, wine, liquor or use any other drugs? (pregnancy) No   Maternal Screening call completed Yes   Call end time 1401              [facility] - Registered Nurse

## 2025-08-13 ENCOUNTER — POSTPARTUM VISIT (OUTPATIENT)
Dept: OBSTETRICS AND GYNECOLOGY | Age: 35
End: 2025-08-13
Payer: COMMERCIAL

## 2025-08-13 VITALS
SYSTOLIC BLOOD PRESSURE: 122 MMHG | DIASTOLIC BLOOD PRESSURE: 88 MMHG | BODY MASS INDEX: 29.29 KG/M2 | WEIGHT: 187 LBS | HEART RATE: 76 BPM

## 2025-08-21 DIAGNOSIS — Z30.430 ENCOUNTER FOR IUD INSERTION: Primary | ICD-10-CM

## 2025-08-21 RX ORDER — LEVONORGESTREL 52 MG/1
1 INTRAUTERINE DEVICE INTRAUTERINE ONCE
Qty: 1 EACH | Refills: 0 | Status: SHIPPED | OUTPATIENT
Start: 2025-08-21 | End: 2025-08-22

## 2025-08-27 ENCOUNTER — CLINICAL SUPPORT (OUTPATIENT)
Dept: OBSTETRICS AND GYNECOLOGY | Age: 35
End: 2025-08-27
Payer: COMMERCIAL

## 2025-08-27 DIAGNOSIS — Z30.430 ENCOUNTER FOR IUD INSERTION: ICD-10-CM

## 2025-08-27 LAB — HCG INTACT+B SERPL-ACNC: <1 MIU/ML

## 2025-08-27 PROCEDURE — 84702 CHORIONIC GONADOTROPIN TEST: CPT | Performed by: OBSTETRICS & GYNECOLOGY

## 2025-08-28 ENCOUNTER — PROCEDURE VISIT (OUTPATIENT)
Dept: OBSTETRICS AND GYNECOLOGY | Age: 35
End: 2025-08-28
Payer: COMMERCIAL

## 2025-08-28 VITALS
BODY MASS INDEX: 27.78 KG/M2 | WEIGHT: 177 LBS | DIASTOLIC BLOOD PRESSURE: 84 MMHG | HEART RATE: 80 BPM | HEIGHT: 67 IN | SYSTOLIC BLOOD PRESSURE: 127 MMHG

## 2025-08-28 DIAGNOSIS — Z30.430 ENCOUNTER FOR IUD INSERTION: Primary | ICD-10-CM
